# Patient Record
Sex: MALE | Race: ASIAN | NOT HISPANIC OR LATINO | Employment: FULL TIME | URBAN - METROPOLITAN AREA
[De-identification: names, ages, dates, MRNs, and addresses within clinical notes are randomized per-mention and may not be internally consistent; named-entity substitution may affect disease eponyms.]

---

## 2017-05-12 ENCOUNTER — LAB VISIT (OUTPATIENT)
Dept: LAB | Facility: HOSPITAL | Age: 32
End: 2017-05-12
Attending: FAMILY MEDICINE
Payer: COMMERCIAL

## 2017-05-12 ENCOUNTER — OFFICE VISIT (OUTPATIENT)
Dept: INTERNAL MEDICINE | Facility: CLINIC | Age: 32
End: 2017-05-12
Payer: COMMERCIAL

## 2017-05-12 VITALS
OXYGEN SATURATION: 99 % | SYSTOLIC BLOOD PRESSURE: 122 MMHG | DIASTOLIC BLOOD PRESSURE: 84 MMHG | HEIGHT: 65 IN | TEMPERATURE: 96 F | BODY MASS INDEX: 28.25 KG/M2 | WEIGHT: 169.56 LBS | HEART RATE: 73 BPM

## 2017-05-12 DIAGNOSIS — Z00.00 ANNUAL PHYSICAL EXAM: Primary | ICD-10-CM

## 2017-05-12 DIAGNOSIS — Z00.00 ANNUAL PHYSICAL EXAM: ICD-10-CM

## 2017-05-12 LAB
ALBUMIN SERPL BCP-MCNC: 4.2 G/DL
ALP SERPL-CCNC: 78 U/L
ALT SERPL W/O P-5'-P-CCNC: 38 U/L
ANION GAP SERPL CALC-SCNC: 9 MMOL/L
AST SERPL-CCNC: 19 U/L
BASOPHILS # BLD AUTO: 0.03 K/UL
BASOPHILS NFR BLD: 0.4 %
BILIRUB SERPL-MCNC: 0.6 MG/DL
BUN SERPL-MCNC: 10 MG/DL
CALCIUM SERPL-MCNC: 9.9 MG/DL
CHLORIDE SERPL-SCNC: 104 MMOL/L
CHOLEST/HDLC SERPL: 3.2 {RATIO}
CO2 SERPL-SCNC: 25 MMOL/L
CREAT SERPL-MCNC: 0.9 MG/DL
DIFFERENTIAL METHOD: ABNORMAL
EOSINOPHIL # BLD AUTO: 0.2 K/UL
EOSINOPHIL NFR BLD: 2.2 %
ERYTHROCYTE [DISTWIDTH] IN BLOOD BY AUTOMATED COUNT: 15.6 %
EST. GFR  (AFRICAN AMERICAN): >60 ML/MIN/1.73 M^2
EST. GFR  (NON AFRICAN AMERICAN): >60 ML/MIN/1.73 M^2
FERRITIN SERPL-MCNC: 41 NG/ML
GLUCOSE SERPL-MCNC: 91 MG/DL
HCT VFR BLD AUTO: 45.9 %
HDL/CHOLESTEROL RATIO: 31.3 %
HDLC SERPL-MCNC: 115 MG/DL
HDLC SERPL-MCNC: 36 MG/DL
HGB BLD-MCNC: 14.6 G/DL
IRON SERPL-MCNC: 78 UG/DL
LDLC SERPL CALC-MCNC: 58.6 MG/DL
LYMPHOCYTES # BLD AUTO: 2.8 K/UL
LYMPHOCYTES NFR BLD: 38.8 %
MCH RBC QN AUTO: 22.8 PG
MCHC RBC AUTO-ENTMCNC: 31.8 %
MCV RBC AUTO: 72 FL
MONOCYTES # BLD AUTO: 0.5 K/UL
MONOCYTES NFR BLD: 6.4 %
NEUTROPHILS # BLD AUTO: 3.7 K/UL
NEUTROPHILS NFR BLD: 52.2 %
NONHDLC SERPL-MCNC: 79 MG/DL
PLATELET # BLD AUTO: 206 K/UL
PMV BLD AUTO: ABNORMAL FL
POTASSIUM SERPL-SCNC: 4.1 MMOL/L
PROT SERPL-MCNC: 7.9 G/DL
RBC # BLD AUTO: 6.4 M/UL
SATURATED IRON: 19 %
SODIUM SERPL-SCNC: 138 MMOL/L
TOTAL IRON BINDING CAPACITY: 414 UG/DL
TRANSFERRIN SERPL-MCNC: 280 MG/DL
TRIGL SERPL-MCNC: 102 MG/DL
WBC # BLD AUTO: 7.16 K/UL

## 2017-05-12 PROCEDURE — 36415 COLL VENOUS BLD VENIPUNCTURE: CPT | Mod: PO

## 2017-05-12 PROCEDURE — 99395 PREV VISIT EST AGE 18-39: CPT | Mod: S$GLB,,, | Performed by: FAMILY MEDICINE

## 2017-05-12 PROCEDURE — 80053 COMPREHEN METABOLIC PANEL: CPT

## 2017-05-12 PROCEDURE — 99999 PR PBB SHADOW E&M-EST. PATIENT-LVL III: CPT | Mod: PBBFAC,,, | Performed by: FAMILY MEDICINE

## 2017-05-12 PROCEDURE — 85025 COMPLETE CBC W/AUTO DIFF WBC: CPT

## 2017-05-12 PROCEDURE — 82728 ASSAY OF FERRITIN: CPT

## 2017-05-12 PROCEDURE — 83540 ASSAY OF IRON: CPT

## 2017-05-12 PROCEDURE — 80061 LIPID PANEL: CPT

## 2017-05-12 NOTE — PROGRESS NOTES
Subjective:       Patient ID: Hira Riggs is a 31 y.o. male.    Chief Complaint: Annual Exam    HPI Comments: Annual Exam:       Pt has been having some ongoing HA off and on. Pt is otherwise generally good health. Pt reports the HA has gotten severe he takes Advil. Pt reports somewhat mix of severity of HA. Pt reports location of HA can be generalized. Unilateral but can be bilaterally. Pt reports no visual change but is sensitive to light and sound. Pt reports going to sleep it goes away. Has not woke him up at night.    Review of Systems   Constitutional: Negative.    Respiratory: Negative.    Cardiovascular: Negative.    Musculoskeletal: Negative.    Skin: Negative.    Neurological: Positive for headaches.   Psychiatric/Behavioral: Negative.        Objective:      Physical Exam   Constitutional: He is oriented to person, place, and time. He appears well-developed and well-nourished.   Cardiovascular: Normal rate and regular rhythm.    Pulmonary/Chest: Effort normal and breath sounds normal.   Abdominal: Soft. Bowel sounds are normal.   Neurological: He is alert and oriented to person, place, and time.   Psychiatric: He has a normal mood and affect. His behavior is normal.       Assessment:       1. Annual physical exam        Plan:       Annual physical exam  Comments:  Will do CBC, CMP and Lipid with iron studies  Orders:  -     Comprehensive metabolic panel; Future; Expected date: 5/12/17  -     CBC auto differential; Future; Expected date: 5/12/17  -     Lipid panel; Future; Expected date: 5/12/17  -     Iron and TIBC; Future; Expected date: 5/12/17  -     Ferritin; Future; Expected date: 5/12/17

## 2017-05-12 NOTE — MR AVS SNAPSHOT
[facility] Internal Medicine  [address] [place] [address]  Phone: [phone]  Fax: [phone]                  Hira Riggs   2017 7:40 AM   Office Visit    Description:  Male : 1985   Provider:  Dario Borges MD   Department:  [facility] Internal Medicine           Reason for Visit     Annual Exam           Diagnoses this Visit        Comments    Annual physical exam    -  Primary Will do CBC, CMP and Lipid with iron studies           To Do List           Future Appointments        Provider Department Dept Phone    2017 9:10 AM LABORATORY, [facility] [phone]      Goals (5 Years of Data)              Today    14    Blood Pressure < 140/90   122/84    122/84    LDL CHOLESTEROL < 130     62.8      Weight < 200 lb (90.719 kg)   76.9 kg (169 lb 8.5 oz)    82.7 kg (182 lb 5 oz)      Follow-Up and Disposition     Return in about 1 year (around 2018).      [facility]     [facility] Nurse Care Line -  Assistance  Unless otherwise directed by your provider, please contact [facility], our nurse care line that is available for  assistance.     Registered nurses in the [facility] provide: appointment scheduling, clinical advisement, health education, and other advisory services.  Call: [phone] (toll free)               Medications           Message regarding Medications     Verify the changes and/or additions to your medication regime listed below are the same as discussed with your clinician today.  If any of these changes or additions are incorrect, please notify your healthcare provider.             Verify that the below list of medications is an accurate representation of the medications you are currently taking.  If none reported, the list may be blank. If incorrect, please contact your healthcare provider. Carry this list with you in case of emergency.                Clinical  "Reference Information           Your Vitals Were     BP Pulse Temp Height Weight SpO2    122/84 (BP Location: Right arm, Patient Position: Sitting) 73 96.1 °F (35.6 °C) (Tympanic) 5' 5" (1.651 m) 76.9 kg (169 lb 8.5 oz) 99%    BMI                28.21 kg/m2          Blood Pressure          Most Recent Value    BP  122/84      Allergies as of 5/12/2017     No Known Allergies      Immunizations Administered on Date of Encounter - 5/12/2017     None      Orders Placed During Today's Visit     Future Labs/Procedures Expected by Expires    CBC auto differential  5/12/2017 7/11/2018    Comprehensive metabolic panel  5/12/2017 7/11/2018    Ferritin  5/12/2017 7/11/2018    Iron and TIBC  5/12/2017 7/11/2018    Lipid panel  5/12/2017 5/12/2018      Language Assistance Services     ATTENTION: Language assistance services are available, free of charge. Please call [phone].      ATENCIÓN: Si habla español, tiene a corral disposición servicios gratuitos de asistencia lingüística. Llame al [phone].     CHÚ Ý: N?u b?n nói Ti?ng Vi?t, có các d?ch v? h? tr? ngôn ng? mi?n phí dành cho b?n. G?i s? [phone].         [facility] complies with applicable Federal civil rights laws and does not discriminate on the basis of race, color, national origin, age, disability, or sex.        "

## 2017-05-15 RX ORDER — FERROUS SULFATE 325(65) MG
325 TABLET ORAL 2 TIMES DAILY
Qty: 60 TABLET | Refills: 8 | COMMUNITY
Start: 2017-05-15 | End: 2018-03-05 | Stop reason: ALTCHOICE

## 2017-08-14 PROBLEM — Z00.00 ANNUAL PHYSICAL EXAM: Status: RESOLVED | Noted: 2017-05-12 | Resolved: 2017-08-14

## 2018-03-05 ENCOUNTER — OFFICE VISIT (OUTPATIENT)
Dept: INTERNAL MEDICINE | Facility: CLINIC | Age: 33
End: 2018-03-05
Payer: COMMERCIAL

## 2018-03-05 VITALS
WEIGHT: 163.56 LBS | SYSTOLIC BLOOD PRESSURE: 128 MMHG | HEART RATE: 98 BPM | HEIGHT: 65 IN | DIASTOLIC BLOOD PRESSURE: 82 MMHG | TEMPERATURE: 96 F | BODY MASS INDEX: 27.25 KG/M2

## 2018-03-05 DIAGNOSIS — J32.1 FRONTAL SINUSITIS, UNSPECIFIED CHRONICITY: Primary | ICD-10-CM

## 2018-03-05 PROCEDURE — 99999 PR PBB SHADOW E&M-EST. PATIENT-LVL III: CPT | Mod: PBBFAC,,, | Performed by: FAMILY MEDICINE

## 2018-03-05 PROCEDURE — 99214 OFFICE O/P EST MOD 30 MIN: CPT | Mod: S$GLB,,, | Performed by: FAMILY MEDICINE

## 2018-03-05 RX ORDER — AMOXICILLIN AND CLAVULANATE POTASSIUM 500; 125 MG/1; MG/1
1 TABLET, FILM COATED ORAL 2 TIMES DAILY
Qty: 20 TABLET | Refills: 0 | Status: SHIPPED | OUTPATIENT
Start: 2018-03-05 | End: 2018-03-05 | Stop reason: SDUPTHER

## 2018-03-05 RX ORDER — METHYLPREDNISOLONE 4 MG/1
TABLET ORAL
Qty: 1 PACKAGE | Refills: 0 | Status: SHIPPED | OUTPATIENT
Start: 2018-03-05 | End: 2018-03-26

## 2018-03-05 RX ORDER — AMOXICILLIN AND CLAVULANATE POTASSIUM 500; 125 MG/1; MG/1
1 TABLET, FILM COATED ORAL 2 TIMES DAILY
Qty: 20 TABLET | Refills: 0 | Status: SHIPPED | OUTPATIENT
Start: 2018-03-05 | End: 2018-05-14 | Stop reason: ALTCHOICE

## 2018-03-05 RX ORDER — METHYLPREDNISOLONE 4 MG/1
TABLET ORAL
Qty: 1 PACKAGE | Refills: 0 | Status: SHIPPED | OUTPATIENT
Start: 2018-03-05 | End: 2018-03-05 | Stop reason: SDUPTHER

## 2018-03-05 NOTE — PROGRESS NOTES
Subjective:       Patient ID: Hira Riggs is a 32 y.o. male.    Chief Complaint: Nasal Congestion and Insomnia    Sinus Congestion:       Pt is having some sinus congestion. Pt is having some increase sinus drainage. Pt is not coughing. Sinus pressure.       Review of Systems   HENT: Positive for postnasal drip, sinus pain and sinus pressure. Negative for sore throat.    Respiratory: Negative.    Cardiovascular: Negative.    Genitourinary: Negative.    Neurological: Negative.    Hematological: Negative.        Objective:      Physical Exam   Constitutional: He appears well-developed and well-nourished.   HENT:   Right Ear: Tympanic membrane is erythematous. A middle ear effusion is present.   Left Ear: Tympanic membrane is erythematous. A middle ear effusion is present.   Nose: Mucosal edema and rhinorrhea present. Right sinus exhibits frontal sinus tenderness. Right sinus exhibits no maxillary sinus tenderness. Left sinus exhibits frontal sinus tenderness. Left sinus exhibits no maxillary sinus tenderness.   Cardiovascular: Normal rate and regular rhythm.    Pulmonary/Chest: Effort normal and breath sounds normal. He has no wheezes. He has no rales.   Abdominal: Soft. Bowel sounds are normal.   Skin: Skin is warm and dry.       Assessment:       1. Frontal sinusitis, unspecified chronicity        Plan:       Frontal sinusitis, unspecified chronicity  Comments:  Will start pt on augmentin 500 mg and medrol dose pack.    Other orders  -     Discontinue: amoxicillin-clavulanate 500-125mg (AUGMENTIN) 500-125 mg Tab; Take 1 tablet (500 mg total) by mouth 2 (two) times daily.  Dispense: 20 tablet; Refill: 0  -     Discontinue: methylPREDNISolone (MEDROL DOSEPACK) 4 mg tablet; use as directed  Dispense: 1 Package; Refill: 0  -     Discontinue: methylPREDNISolone (MEDROL DOSEPACK) 4 mg tablet; use as directed  Dispense: 1 Package; Refill: 0  -     Discontinue: amoxicillin-clavulanate 500-125mg (AUGMENTIN) 500-125  mg Tab; Take 1 tablet (500 mg total) by mouth 2 (two) times daily.  Dispense: 20 tablet; Refill: 0  -     methylPREDNISolone (MEDROL DOSEPACK) 4 mg tablet; use as directed  Dispense: 1 Package; Refill: 0  -     amoxicillin-clavulanate 500-125mg (AUGMENTIN) 500-125 mg Tab; Take 1 tablet (500 mg total) by mouth 2 (two) times daily.  Dispense: 20 tablet; Refill: 0

## 2018-05-10 ENCOUNTER — TELEPHONE (OUTPATIENT)
Dept: INTERNAL MEDICINE | Facility: CLINIC | Age: 33
End: 2018-05-10

## 2018-05-10 DIAGNOSIS — Z00.00 ANNUAL PHYSICAL EXAM: Primary | ICD-10-CM

## 2018-05-10 NOTE — TELEPHONE ENCOUNTER
Patient has an appointment scheduled 5/14/2018 and would like lab orders done prior. Please place orders

## 2018-05-10 NOTE — TELEPHONE ENCOUNTER
----- Message from Amie Jha sent at 5/9/2018  6:31 PM CDT -----  Contact: Pt  Pt is calling to have lab orders placed in system and can be reached at [phone].    Thank you

## 2018-05-11 ENCOUNTER — LAB VISIT (OUTPATIENT)
Dept: LAB | Facility: HOSPITAL | Age: 33
End: 2018-05-11
Attending: FAMILY MEDICINE
Payer: COMMERCIAL

## 2018-05-11 DIAGNOSIS — Z00.00 ANNUAL PHYSICAL EXAM: ICD-10-CM

## 2018-05-11 LAB
ALBUMIN SERPL BCP-MCNC: 4.2 G/DL
ALP SERPL-CCNC: 79 U/L
ALT SERPL W/O P-5'-P-CCNC: 44 U/L
ANION GAP SERPL CALC-SCNC: 7 MMOL/L
AST SERPL-CCNC: 23 U/L
BASOPHILS # BLD AUTO: 0.03 K/UL
BASOPHILS NFR BLD: 0.5 %
BILIRUB SERPL-MCNC: 0.7 MG/DL
BUN SERPL-MCNC: 8 MG/DL
CALCIUM SERPL-MCNC: 9.7 MG/DL
CHLORIDE SERPL-SCNC: 105 MMOL/L
CHOLEST SERPL-MCNC: 109 MG/DL
CHOLEST/HDLC SERPL: 3.1 {RATIO}
CO2 SERPL-SCNC: 27 MMOL/L
CREAT SERPL-MCNC: 0.9 MG/DL
DIFFERENTIAL METHOD: ABNORMAL
EOSINOPHIL # BLD AUTO: 0.1 K/UL
EOSINOPHIL NFR BLD: 2 %
ERYTHROCYTE [DISTWIDTH] IN BLOOD BY AUTOMATED COUNT: 23.1 %
EST. GFR  (AFRICAN AMERICAN): >60 ML/MIN/1.73 M^2
EST. GFR  (NON AFRICAN AMERICAN): >60 ML/MIN/1.73 M^2
GLUCOSE SERPL-MCNC: 87 MG/DL
HCT VFR BLD AUTO: 41.3 %
HDLC SERPL-MCNC: 35 MG/DL
HDLC SERPL: 32.1 %
HGB BLD-MCNC: 12.2 G/DL
IMM GRANULOCYTES # BLD AUTO: 0.01 K/UL
IMM GRANULOCYTES NFR BLD AUTO: 0.2 %
LDLC SERPL CALC-MCNC: 59.2 MG/DL
LYMPHOCYTES # BLD AUTO: 2.5 K/UL
LYMPHOCYTES NFR BLD: 38.5 %
MCH RBC QN AUTO: 19.4 PG
MCHC RBC AUTO-ENTMCNC: 29.5 G/DL
MCV RBC AUTO: 66 FL
MONOCYTES # BLD AUTO: 0.5 K/UL
MONOCYTES NFR BLD: 7.9 %
NEUTROPHILS # BLD AUTO: 3.4 K/UL
NEUTROPHILS NFR BLD: 50.9 %
NONHDLC SERPL-MCNC: 74 MG/DL
NRBC BLD-RTO: 0 /100 WBC
PLATELET # BLD AUTO: 193 K/UL
PMV BLD AUTO: ABNORMAL FL
POTASSIUM SERPL-SCNC: 3.9 MMOL/L
PROT SERPL-MCNC: 7.7 G/DL
RBC # BLD AUTO: 6.28 M/UL
SODIUM SERPL-SCNC: 139 MMOL/L
TRIGL SERPL-MCNC: 74 MG/DL
WBC # BLD AUTO: 6.59 K/UL

## 2018-05-11 PROCEDURE — 36415 COLL VENOUS BLD VENIPUNCTURE: CPT | Mod: PO

## 2018-05-11 PROCEDURE — 80061 LIPID PANEL: CPT

## 2018-05-11 PROCEDURE — 85025 COMPLETE CBC W/AUTO DIFF WBC: CPT

## 2018-05-11 PROCEDURE — 80053 COMPREHEN METABOLIC PANEL: CPT

## 2018-05-14 ENCOUNTER — OFFICE VISIT (OUTPATIENT)
Dept: INTERNAL MEDICINE | Facility: CLINIC | Age: 33
End: 2018-05-14
Payer: COMMERCIAL

## 2018-05-14 VITALS
DIASTOLIC BLOOD PRESSURE: 68 MMHG | SYSTOLIC BLOOD PRESSURE: 112 MMHG | BODY MASS INDEX: 26.93 KG/M2 | WEIGHT: 161.63 LBS | HEART RATE: 98 BPM | TEMPERATURE: 97 F | HEIGHT: 65 IN

## 2018-05-14 DIAGNOSIS — E55.9 VITAMIN D DEFICIENCY: ICD-10-CM

## 2018-05-14 DIAGNOSIS — D50.9 MICROCYTIC ANEMIA: ICD-10-CM

## 2018-05-14 DIAGNOSIS — Z00.00 ANNUAL PHYSICAL EXAM: Primary | ICD-10-CM

## 2018-05-14 PROCEDURE — 99395 PREV VISIT EST AGE 18-39: CPT | Mod: S$GLB,,, | Performed by: FAMILY MEDICINE

## 2018-05-14 PROCEDURE — 99999 PR PBB SHADOW E&M-EST. PATIENT-LVL III: CPT | Mod: PBBFAC,,, | Performed by: FAMILY MEDICINE

## 2018-05-14 NOTE — PROGRESS NOTES
Subjective:       Patient ID: Hira Riggs is a 32 y.o. male.    Chief Complaint: Annual Exam and Foot Pain    Annual Exam:      Pt is a 32 year old who is generally good health.       Foot Pain   This is a new problem. The current episode started today. The problem occurs constantly. The problem has been unchanged. Pertinent negatives include no abdominal pain, joint swelling, myalgias or nausea. Nothing aggravates the symptoms. He has tried nothing for the symptoms. The treatment provided mild relief.     Review of Systems   Constitutional: Negative.    HENT: Negative.    Respiratory: Negative.    Gastrointestinal: Negative.  Negative for abdominal pain and nausea.   Genitourinary: Negative.  Negative for frequency and genital sores.   Musculoskeletal: Negative.  Negative for joint swelling and myalgias.   Neurological: Negative.    Hematological: Negative.        Objective:      Physical Exam   Constitutional: He is oriented to person, place, and time. He appears well-developed and well-nourished.   HENT:   Head: Normocephalic.   Eyes: EOM are normal. Pupils are equal, round, and reactive to light.   Neck: Normal range of motion. Neck supple. No JVD present. No thyromegaly present.   Cardiovascular: Normal rate and regular rhythm.    Pulmonary/Chest: Effort normal and breath sounds normal.   Abdominal: Soft. Bowel sounds are normal.   Musculoskeletal: Normal range of motion.   Lymphadenopathy:     He has no cervical adenopathy.   Neurological: He is alert and oriented to person, place, and time. He has normal reflexes.   Skin: Skin is warm and dry.   Psychiatric: He has a normal mood and affect. His behavior is normal.       Assessment:       1. Annual physical exam    2. Microcytic anemia    3. Vitamin D deficiency        Plan:       Annual physical exam    Microcytic anemia  -     CBC auto differential; Future; Expected date: 09/14/2018  -     Ferritin; Future; Expected date: 09/14/2018  -     Iron and  TIBC; Future; Expected date: 09/14/2018    Vitamin D deficiency  -     Vitamin D; Future; Expected date: 09/14/2018

## 2018-08-13 PROBLEM — Z00.00 ANNUAL PHYSICAL EXAM: Status: RESOLVED | Noted: 2017-05-12 | Resolved: 2018-08-13

## 2018-09-14 ENCOUNTER — LAB VISIT (OUTPATIENT)
Dept: LAB | Facility: HOSPITAL | Age: 33
End: 2018-09-14
Attending: FAMILY MEDICINE
Payer: COMMERCIAL

## 2018-09-14 DIAGNOSIS — D50.9 MICROCYTIC ANEMIA: ICD-10-CM

## 2018-09-14 DIAGNOSIS — E55.9 VITAMIN D DEFICIENCY: ICD-10-CM

## 2018-09-14 LAB
25(OH)D3+25(OH)D2 SERPL-MCNC: 18 NG/ML
BASOPHILS # BLD AUTO: 0.04 K/UL
BASOPHILS NFR BLD: 0.6 %
DIFFERENTIAL METHOD: ABNORMAL
EOSINOPHIL # BLD AUTO: 0.1 K/UL
EOSINOPHIL NFR BLD: 1.8 %
ERYTHROCYTE [DISTWIDTH] IN BLOOD BY AUTOMATED COUNT: 20.2 %
FERRITIN SERPL-MCNC: 11 NG/ML
HCT VFR BLD AUTO: 47.4 %
HGB BLD-MCNC: 14.5 G/DL
IMM GRANULOCYTES # BLD AUTO: 0.03 K/UL
IMM GRANULOCYTES NFR BLD AUTO: 0.4 %
IRON SERPL-MCNC: 111 UG/DL
LYMPHOCYTES # BLD AUTO: 2.4 K/UL
LYMPHOCYTES NFR BLD: 34.4 %
MCH RBC QN AUTO: 21.6 PG
MCHC RBC AUTO-ENTMCNC: 30.6 G/DL
MCV RBC AUTO: 71 FL
MONOCYTES # BLD AUTO: 0.5 K/UL
MONOCYTES NFR BLD: 7.5 %
NEUTROPHILS # BLD AUTO: 3.9 K/UL
NEUTROPHILS NFR BLD: 55.3 %
NRBC BLD-RTO: 0 /100 WBC
PLATELET # BLD AUTO: 172 K/UL
PMV BLD AUTO: ABNORMAL FL
RBC # BLD AUTO: 6.71 M/UL
SATURATED IRON: 22 %
TOTAL IRON BINDING CAPACITY: 502 UG/DL
TRANSFERRIN SERPL-MCNC: 339 MG/DL
WBC # BLD AUTO: 7.04 K/UL

## 2018-09-14 PROCEDURE — 82728 ASSAY OF FERRITIN: CPT

## 2018-09-14 PROCEDURE — 36415 COLL VENOUS BLD VENIPUNCTURE: CPT | Mod: PO

## 2018-09-14 PROCEDURE — 83540 ASSAY OF IRON: CPT

## 2018-09-14 PROCEDURE — 82306 VITAMIN D 25 HYDROXY: CPT

## 2018-09-14 PROCEDURE — 85025 COMPLETE CBC W/AUTO DIFF WBC: CPT

## 2018-09-19 ENCOUNTER — OFFICE VISIT (OUTPATIENT)
Dept: INTERNAL MEDICINE | Facility: CLINIC | Age: 33
End: 2018-09-19
Payer: COMMERCIAL

## 2018-09-19 VITALS
SYSTOLIC BLOOD PRESSURE: 102 MMHG | BODY MASS INDEX: 27.51 KG/M2 | HEIGHT: 65 IN | DIASTOLIC BLOOD PRESSURE: 80 MMHG | WEIGHT: 165.13 LBS | HEART RATE: 85 BPM | TEMPERATURE: 98 F

## 2018-09-19 DIAGNOSIS — E55.9 VITAMIN D DEFICIENCY: ICD-10-CM

## 2018-09-19 DIAGNOSIS — D50.9 MICROCYTIC ANEMIA: Primary | ICD-10-CM

## 2018-09-19 PROCEDURE — 99999 PR PBB SHADOW E&M-EST. PATIENT-LVL III: CPT | Mod: PBBFAC,,, | Performed by: FAMILY MEDICINE

## 2018-09-19 PROCEDURE — 99213 OFFICE O/P EST LOW 20 MIN: CPT | Mod: S$GLB,,, | Performed by: FAMILY MEDICINE

## 2018-09-19 NOTE — PROGRESS NOTES
Subjective:       Patient ID: Hira Riggs is a 32 y.o. male.    Chief Complaint: Follow-up labs    F/U:      Pt a 32 year old who discussed his microcytic anemia of which now looks like iron is normal but still with low MCV suggesting possible thalassemia.       Review of Systems   Constitutional: Negative.    HENT: Negative.    Respiratory: Negative.    Cardiovascular: Negative.    Gastrointestinal: Negative.    Skin: Negative.    Neurological: Negative.    Psychiatric/Behavioral: Negative.        Objective:      Physical Exam   Constitutional: He is oriented to person, place, and time. He appears well-developed and well-nourished.   Cardiovascular: Normal rate and regular rhythm.   Pulmonary/Chest: Effort normal and breath sounds normal. He has no wheezes.   Abdominal: Soft. Bowel sounds are normal.   Neurological: He is alert and oriented to person, place, and time.   Skin: Skin is warm and dry.       Assessment:       1. Microcytic anemia    2. Vitamin D deficiency        Plan:       Microcytic anemia  Comments:  iron corrected low MCV may be thalassemia.   Orders:  -     Ambulatory consult to Hematology    Vitamin D deficiency  Comments:  Over the counter sup with Vit D

## 2018-09-25 ENCOUNTER — INITIAL CONSULT (OUTPATIENT)
Dept: HEMATOLOGY/ONCOLOGY | Facility: CLINIC | Age: 33
End: 2018-09-25
Payer: COMMERCIAL

## 2018-09-25 ENCOUNTER — TELEPHONE (OUTPATIENT)
Dept: HEMATOLOGY/ONCOLOGY | Facility: CLINIC | Age: 33
End: 2018-09-25

## 2018-09-25 VITALS
SYSTOLIC BLOOD PRESSURE: 150 MMHG | TEMPERATURE: 99 F | OXYGEN SATURATION: 99 % | HEIGHT: 65 IN | HEART RATE: 66 BPM | DIASTOLIC BLOOD PRESSURE: 80 MMHG | WEIGHT: 164 LBS | BODY MASS INDEX: 27.32 KG/M2 | RESPIRATION RATE: 18 BRPM

## 2018-09-25 DIAGNOSIS — D50.0 IRON DEFICIENCY ANEMIA DUE TO CHRONIC BLOOD LOSS: ICD-10-CM

## 2018-09-25 DIAGNOSIS — I35.1 AORTIC EJECTION MURMUR: ICD-10-CM

## 2018-09-25 DIAGNOSIS — D50.9 MICROCYTIC ANEMIA: Primary | ICD-10-CM

## 2018-09-25 PROCEDURE — 99999 PR PBB SHADOW E&M-EST. PATIENT-LVL III: CPT | Mod: PBBFAC,,, | Performed by: INTERNAL MEDICINE

## 2018-09-25 PROCEDURE — 99245 OFF/OP CONSLTJ NEW/EST HI 55: CPT | Mod: S$GLB,,, | Performed by: INTERNAL MEDICINE

## 2018-09-25 NOTE — LETTER
September 25, 2018      Dario Borges MD  [address] [place] [address]           [facility] - H[facility]ology Oncology  [address] [place] [address]  Phone: [phone]  Fax: [phone]          Patient: Hira Riggs   MR Number: [id number]   YOB: 1985   Date of Visit: 9/25/2018       Dear Dr. Dario Borges:    Thank you for referring Hira Riggs to me for evaluation. Attached you will find relevant portions of my assessment and plan of care.    If you have questions, please do not hesitate to call me. I look forward to following Hira Riggs along with you.    Sincerely,    Pilo March MD    Enclosure  CC:  No Recipients    If you would like to receive this communication electronically, please contact [email or web address] or [phone] to request more information on [employer] Link access.    For providers and/or their staff who would like to refer a patient to [facility], please contact us through our one-stop-shop provider referral line, [facility], at [phone].    If you feel you have received this communication in error or would no longer like to receive these types of communications, please e-mail [email or web address]

## 2018-09-25 NOTE — PROGRESS NOTES
Subjective:       Patient ID: Hira Riggs is a 32 y.o. male.    Chief Complaint: Consult; Anemia; and Results    HPI 32-year-old male referred to me for diagnosis of microcytosis with elevated RBC count in iron deficiency    History reviewed. No pertinent past medical history.  Family History   Problem Relation Age of Onset    Other Other         none.     Social History     Socioeconomic History    Marital status:      Spouse name: Not on file    Number of children: Not on file    Years of education: Not on file    Highest education level: Not on file   Social Needs    Financial resource strain: Not on file    Food insecurity - worry: Not on file    Food insecurity - inability: Not on file    Transportation needs - medical: Not on file    Transportation needs - non-medical: Not on file   Occupational History    Not on file   Tobacco Use    Smoking status: Never Smoker    Smokeless tobacco: Never Used   Substance and Sexual Activity    Alcohol use: No    Drug use: No    Sexual activity: Not on file   Other Topics Concern    Not on file   Social History Narrative    Not on file     History reviewed. No pertinent surgical history.    Labs:  Lab Results   Component Value Date    WBC 7.04 09/14/2018    HGB 14.5 09/14/2018    HCT 47.4 09/14/2018    MCV 71 (L) 09/14/2018     09/14/2018     BMP  Lab Results   Component Value Date     05/11/2018    K 3.9 05/11/2018     05/11/2018    CO2 27 05/11/2018    BUN 8 05/11/2018    CREATININE 0.9 05/11/2018    CALCIUM 9.7 05/11/2018    ANIONGAP 7 (L) 05/11/2018    ESTGFRAFRICA >60.0 05/11/2018    EGFRNONAA >60.0 05/11/2018     Lab Results   Component Value Date    ALT 44 05/11/2018    AST 23 05/11/2018    ALKPHOS 79 05/11/2018    BILITOT 0.7 05/11/2018       Lab Results   Component Value Date    IRON 111 09/14/2018    TIBC 502 (H) 09/14/2018    FERRITIN 11 (L) 09/14/2018     No results found for: NQOMIXVK35  No results found for:  FOLATE  No results found for: TSH      Review of Systems   Constitutional: Negative for activity change, appetite change, chills, diaphoresis, fatigue, fever and unexpected weight change.   HENT: Negative for congestion, dental problem, drooling, ear discharge, ear pain, facial swelling, hearing loss, mouth sores, nosebleeds, postnasal drip, rhinorrhea, sinus pressure, sneezing, sore throat, tinnitus, trouble swallowing and voice change.    Eyes: Negative for photophobia, pain, discharge, redness, itching and visual disturbance.   Respiratory: Negative for apnea, cough, choking, chest tightness, shortness of breath, wheezing and stridor.    Cardiovascular: Negative for chest pain, palpitations and leg swelling.   Gastrointestinal: Negative for abdominal distention, abdominal pain, anal bleeding, blood in stool, constipation, diarrhea, nausea, rectal pain and vomiting.   Endocrine: Negative for cold intolerance, heat intolerance, polydipsia, polyphagia and polyuria.   Genitourinary: Negative for decreased urine volume, difficulty urinating, discharge, dysuria, enuresis, flank pain, frequency, genital sores, hematuria, penile pain, penile swelling, scrotal swelling, testicular pain and urgency.   Musculoskeletal: Negative for arthralgias, back pain, gait problem, joint swelling, myalgias, neck pain and neck stiffness.   Skin: Negative for color change, pallor, rash and wound.   Allergic/Immunologic: Negative for environmental allergies, food allergies and immunocompromised state.   Neurological: Negative for dizziness, tremors, seizures, syncope, facial asymmetry, speech difficulty, weakness, light-headedness, numbness and headaches.   Hematological: Negative for adenopathy. Does not bruise/bleed easily.   Psychiatric/Behavioral: Negative for agitation, behavioral problems, confusion, decreased concentration, dysphoric mood, hallucinations, self-injury, sleep disturbance and suicidal ideas. The patient is  nervous/anxious. The patient is not hyperactive.        Objective:      Physical Exam   Constitutional: He is oriented to person, place, and time. He appears well-developed and well-nourished. No distress.   HENT:   Head: Normocephalic.   Right Ear: Tympanic membrane and external ear normal.   Left Ear: Tympanic membrane and external ear normal.   Nose: Nose normal. Right sinus exhibits no maxillary sinus tenderness and no frontal sinus tenderness. Left sinus exhibits no maxillary sinus tenderness and no frontal sinus tenderness.   Mouth/Throat: Oropharynx is clear and moist. No oropharyngeal exudate.   Eyes: EOM and lids are normal. Pupils are equal, round, and reactive to light. Right eye exhibits no discharge. Left eye exhibits no discharge. Right conjunctiva is not injected. Right conjunctiva has no hemorrhage. Left conjunctiva is not injected. Left conjunctiva has no hemorrhage. No scleral icterus. Right eye exhibits normal extraocular motion. Left eye exhibits normal extraocular motion.   Neck: Normal range of motion. Neck supple. No JVD present. No tracheal deviation present. No thyromegaly present.   Cardiovascular: Normal rate and regular rhythm.   Murmur heard.  2/6 systolic ejection murmur aortic region   Pulmonary/Chest: Effort normal and breath sounds normal. No stridor. No respiratory distress.   Abdominal: Soft. Bowel sounds are normal. He exhibits no mass. There is no hepatosplenomegaly, splenomegaly or hepatomegaly. There is no tenderness.   Musculoskeletal: Normal range of motion. He exhibits no edema or tenderness.   Lymphadenopathy:        Head (right side): No posterior auricular and no occipital adenopathy present.        Head (left side): No posterior auricular and no occipital adenopathy present.     He has no cervical adenopathy.        Right cervical: No superficial cervical, no deep cervical and no posterior cervical adenopathy present.       Left cervical: No superficial cervical, no deep  cervical and no posterior cervical adenopathy present.     He has no axillary adenopathy.        Right: No supraclavicular adenopathy present.        Left: No supraclavicular adenopathy present.   Neurological: He is alert and oriented to person, place, and time. He has normal strength. No cranial nerve deficit. Coordination normal.   Skin: Skin is dry. No rash noted. He is not diaphoretic. No cyanosis or erythema. Nails show no clubbing.   Psychiatric: He has a normal mood and affect. His behavior is normal. Judgment and thought content normal. Cognition and memory are normal.   Vitals reviewed.          Assessment:      1. Microcytic anemia    2. Iron deficiency anemia due to chronic blood loss    3. Aortic ejection murmur           Plan:     Documented iron deficiency in a male.  At this point elevated RBC high likely he has concurrent iron deficiency as well as thalassemia start on iron rich foods recheck in 2-3 months with CBC iron status and hemoglobin electrophoresis prior.  At this point EGD and colon ordered explain rationale to him.  In addition with aortic flow murmur recommend 2D echocardiogram communicate results through patient portal        Pilo March Jr, MD FACP

## 2018-09-27 ENCOUNTER — DOCUMENTATION ONLY (OUTPATIENT)
Dept: ENDOSCOPY | Facility: HOSPITAL | Age: 33
End: 2018-09-27

## 2018-09-27 RX ORDER — SODIUM, POTASSIUM,MAG SULFATES 17.5-3.13G
SOLUTION, RECONSTITUTED, ORAL ORAL
Qty: 354 ML | Refills: 0 | Status: ON HOLD | OUTPATIENT
Start: 2018-09-27 | End: 2018-11-20 | Stop reason: HOSPADM

## 2018-09-27 NOTE — PROGRESS NOTES
09/27/18 Per [employer], Person pressed touch tone key to speak with an endoscopy .  Colonoscopy/EGd scheduled for 11/29/18. Instructions given mailed and sent via my chart. Suprep ordered.

## 2018-10-06 ENCOUNTER — CLINICAL SUPPORT (OUTPATIENT)
Dept: CARDIOLOGY | Facility: CLINIC | Age: 33
End: 2018-10-06
Attending: INTERNAL MEDICINE
Payer: COMMERCIAL

## 2018-10-06 DIAGNOSIS — I35.1 AORTIC EJECTION MURMUR: ICD-10-CM

## 2018-10-06 LAB
DIASTOLIC DYSFUNCTION: NO
ESTIMATED PA SYSTOLIC PRESSURE: 21.49
MITRAL VALVE MOBILITY: NORMAL
RETIRED EF AND QEF - SEE NOTES: 60 (ref 55–65)
TRICUSPID VALVE REGURGITATION: NORMAL

## 2018-10-06 PROCEDURE — 93307 TTE W/O DOPPLER COMPLETE: CPT | Mod: S$GLB,,, | Performed by: INTERNAL MEDICINE

## 2018-10-09 ENCOUNTER — DOCUMENTATION ONLY (OUTPATIENT)
Dept: ENDOSCOPY | Facility: HOSPITAL | Age: 33
End: 2018-10-09

## 2018-10-09 NOTE — PROGRESS NOTES
Due to endo schedule change pt rescheduled from 11/29 to 11/20/18.     New EGD/colonoscopy prep instructions sent via [employer]. Included were suprep instructions and Low fiber diet. Suprep already on hand.

## 2018-11-20 ENCOUNTER — HOSPITAL ENCOUNTER (OUTPATIENT)
Facility: HOSPITAL | Age: 33
Discharge: HOME | End: 2018-11-20
Attending: INTERNAL MEDICINE | Admitting: INTERNAL MEDICINE
Payer: COMMERCIAL

## 2018-11-20 ENCOUNTER — ANESTHESIA (OUTPATIENT)
Dept: ENDOSCOPY | Facility: HOSPITAL | Age: 33
End: 2018-11-20
Payer: COMMERCIAL

## 2018-11-20 ENCOUNTER — ANESTHESIA EVENT (OUTPATIENT)
Dept: ENDOSCOPY | Facility: HOSPITAL | Age: 33
End: 2018-11-20
Payer: COMMERCIAL

## 2018-11-20 VITALS
HEART RATE: 96 BPM | RESPIRATION RATE: 18 BRPM | TEMPERATURE: 98 F | BODY MASS INDEX: 26.82 KG/M2 | OXYGEN SATURATION: 97 % | WEIGHT: 161 LBS | SYSTOLIC BLOOD PRESSURE: 120 MMHG | DIASTOLIC BLOOD PRESSURE: 61 MMHG | HEIGHT: 65 IN

## 2018-11-20 DIAGNOSIS — K29.30 CHRONIC SUPERFICIAL GASTRITIS WITHOUT BLEEDING: ICD-10-CM

## 2018-11-20 DIAGNOSIS — D50.0 IRON DEFICIENCY ANEMIA DUE TO CHRONIC BLOOD LOSS: Primary | ICD-10-CM

## 2018-11-20 DIAGNOSIS — K26.9 DUODENAL ULCER DISEASE: ICD-10-CM

## 2018-11-20 PROCEDURE — 25000003 PHARM REV CODE 250: Performed by: INTERNAL MEDICINE

## 2018-11-20 PROCEDURE — 27201012 HC FORCEPS, HOT/COLD, DISP: Performed by: INTERNAL MEDICINE

## 2018-11-20 PROCEDURE — 37000009 HC ANESTHESIA EA ADD 15 MINS: Performed by: INTERNAL MEDICINE

## 2018-11-20 PROCEDURE — 88305 TISSUE EXAM BY PATHOLOGIST: CPT | Performed by: PATHOLOGY

## 2018-11-20 PROCEDURE — 88342 IMHCHEM/IMCYTCHM 1ST ANTB: CPT | Mod: 26,,, | Performed by: PATHOLOGY

## 2018-11-20 PROCEDURE — 45378 DIAGNOSTIC COLONOSCOPY: CPT | Performed by: INTERNAL MEDICINE

## 2018-11-20 PROCEDURE — 25000003 PHARM REV CODE 250: Performed by: NURSE ANESTHETIST, CERTIFIED REGISTERED

## 2018-11-20 PROCEDURE — 37000008 HC ANESTHESIA 1ST 15 MINUTES: Performed by: INTERNAL MEDICINE

## 2018-11-20 PROCEDURE — 45378 DIAGNOSTIC COLONOSCOPY: CPT | Mod: ,,, | Performed by: INTERNAL MEDICINE

## 2018-11-20 PROCEDURE — 43239 EGD BIOPSY SINGLE/MULTIPLE: CPT | Mod: 51,,, | Performed by: INTERNAL MEDICINE

## 2018-11-20 PROCEDURE — 43239 EGD BIOPSY SINGLE/MULTIPLE: CPT | Performed by: INTERNAL MEDICINE

## 2018-11-20 PROCEDURE — 88305 TISSUE EXAM BY PATHOLOGIST: CPT | Mod: 26,,, | Performed by: PATHOLOGY

## 2018-11-20 PROCEDURE — 63600175 PHARM REV CODE 636 W HCPCS: Performed by: NURSE ANESTHETIST, CERTIFIED REGISTERED

## 2018-11-20 RX ORDER — PROPOFOL 10 MG/ML
VIAL (ML) INTRAVENOUS
Status: DISCONTINUED | OUTPATIENT
Start: 2018-11-20 | End: 2018-11-20

## 2018-11-20 RX ORDER — LIDOCAINE HYDROCHLORIDE 10 MG/ML
INJECTION INFILTRATION; PERINEURAL
Status: DISCONTINUED | OUTPATIENT
Start: 2018-11-20 | End: 2018-11-20

## 2018-11-20 RX ORDER — SODIUM CHLORIDE 0.9 % (FLUSH) 0.9 %
3 SYRINGE (ML) INJECTION
Status: DISCONTINUED | OUTPATIENT
Start: 2018-11-20 | End: 2018-11-20 | Stop reason: HOSPADM

## 2018-11-20 RX ORDER — SODIUM CHLORIDE, SODIUM LACTATE, POTASSIUM CHLORIDE, CALCIUM CHLORIDE 600; 310; 30; 20 MG/100ML; MG/100ML; MG/100ML; MG/100ML
INJECTION, SOLUTION INTRAVENOUS CONTINUOUS
Status: DISCONTINUED | OUTPATIENT
Start: 2018-11-20 | End: 2018-11-20 | Stop reason: HOSPADM

## 2018-11-20 RX ORDER — PANTOPRAZOLE SODIUM 40 MG/1
40 TABLET, DELAYED RELEASE ORAL DAILY
Qty: 30 TABLET | Refills: 3 | Status: SHIPPED | OUTPATIENT
Start: 2018-11-20 | End: 2021-01-25 | Stop reason: ALTCHOICE

## 2018-11-20 RX ADMIN — PROPOFOL 50 MG: 10 INJECTION, EMULSION INTRAVENOUS at 02:11

## 2018-11-20 RX ADMIN — PROPOFOL 150 MG: 10 INJECTION, EMULSION INTRAVENOUS at 02:11

## 2018-11-20 RX ADMIN — PROPOFOL 100 MG: 10 INJECTION, EMULSION INTRAVENOUS at 02:11

## 2018-11-20 RX ADMIN — SODIUM CHLORIDE, SODIUM LACTATE, POTASSIUM CHLORIDE, AND CALCIUM CHLORIDE: 600; 310; 30; 20 INJECTION, SOLUTION INTRAVENOUS at 01:11

## 2018-11-20 RX ADMIN — SODIUM CHLORIDE, SODIUM LACTATE, POTASSIUM CHLORIDE, AND CALCIUM CHLORIDE: 600; 310; 30; 20 INJECTION, SOLUTION INTRAVENOUS at 12:11

## 2018-11-20 RX ADMIN — LIDOCAINE HYDROCHLORIDE 2 ML: 10 INJECTION, SOLUTION INFILTRATION; PERINEURAL at 02:11

## 2018-11-20 NOTE — [employer] PATIENT INSTRUCTIONS
Discharge Summary/Instructions after an Endoscopic Procedure  Patient Name: Hira Riggs  Patient MRN: [id number]  Patient YOB: 1985 Tuesday, November 20, 2018 Vinod Epperson III, MD  RESTRICTIONS:  During your procedure today, you received medications for sedation.  These   medications may affect your judgment, balance and coordination.  Therefore,   for 24 hours, you have the following restrictions:   - DO NOT drive a car, operate machinery, make legal/financial decisions,   sign important papers or drink alcohol.    ACTIVITY:  Today: no heavy lifting, straining or running due to procedural   sedation/anesthesia.  The following day: return to full activity including work.  DIET:  Eat and drink normally unless instructed otherwise.     TREATMENT FOR COMMON SIDE EFFECTS:  - Mild abdominal pain, nausea, belching, bloating or excessive gas:  rest,   eat lightly and use a heating pad.  - Sore Throat: treat with throat lozenges and/or gargle with warm salt   water.  - Because air was used during the procedure, expelling large amounts of air   from your rectum or belching is normal.  - If a bowel prep was taken, you may not have a bowel movement for 1-3 days.    This is normal.  SYMPTOMS TO WATCH FOR AND REPORT TO YOUR PHYSICIAN:  1. Abdominal pain or bloating, other than gas cramps.  2. Chest pain.  3. Back pain.  4. Signs of infection such as: chills or fever occurring within 24 hours   after the procedure.  5. Rectal bleeding, which would show as bright red, maroon, or black stools.   (A tablespoon of blood from the rectum is not serious, especially if   hemorrhoids are present.)  6. Vomiting.  7. Weakness or dizziness.  GO DIRECTLY TO THE NEAREST EMERGENCY ROOM IF YOU HAVE ANY OF THE FOLLOWING:      Difficulty breathing              Chills and/or fever over 101 F   Persistent vomiting and/or vomiting blood   Severe abdominal pain   Severe chest pain   Black, tarry stools   Bleeding- more than one  tablespoon   Any other symptom or condition that you feel may need urgent attention  Your doctor recommends these additional instructions:  If any biopsies were taken, your doctors clinic will contact you in 1 to 2   weeks with any results.  - Await pathology results.   - Discharge patient to home (via wheelchair).   - Resume previous diet.   - Continue present medications.   - Await pathology results.   - Return to GI clinic as previously scheduled.  For questions, problems or results please call your physician Vinod Epperson III, MD at Work:  [phone]  If you have any questions about the above instructions, call the GI   department at [phone] or call the endoscopy unit at [phone]   from 7am until 3 pm.  [facility] [place], EMERGENCY ROOM PHONE NUMBER:   [phone]  IF A COMPLICATION OR EMERGENCY SITUATION ARISES AND YOU ARE UNABLE TO REACH   YOUR PHYSICIAN - GO DIRECTLY TO THE EMERGENCY ROOM.  I have read or have had read to me these discharge instructions for my   procedure and have received a written copy.  I understand these   instructions and will follow-up with my physician if I have any questions.     __________________________________       _____________________________________  Nurse Signature                                          Patient/Designated   Responsible Party Signature  Vinod Epperson III, MD  11/20/2018 3:21:20 PM  This report has been verified and signed electronically.  [employer]

## 2018-11-20 NOTE — ANESTHESIA POSTPROCEDURE EVALUATION
"Anesthesia Post Evaluation    Patient: Hira Riggs    Procedure(s) Performed: Procedure(s) (LRB):  ESOPHAGOGASTRODUODENOSCOPY (EGD) (N/A)  COLONOSCOPY (N/A)    Final Anesthesia Type: MAC  Patient location during evaluation: GI PACU  Patient participation: Yes- Able to Participate  Level of consciousness: awake and alert  Post-procedure vital signs: reviewed and stable  Pain management: adequate  Airway patency: patent  PONV status at discharge: No PONV  Anesthetic complications: no      Cardiovascular status: blood pressure returned to baseline  Respiratory status: unassisted  Hydration status: euvolemic  Follow-up not needed.        Visit Vitals  /77 (BP Location: Left arm, Patient Position: Lying)   Pulse 96   Temp 36.6 °C (97.8 °F) (Oral)   Resp 16   Ht 5' 5" (1.651 m)   Wt 73 kg (161 lb)   SpO2 (!) 94%   BMI 26.79 kg/m²       Pain/Sanya Score: Presence of Pain: denies (11/20/2018 12:35 PM)        "

## 2018-11-20 NOTE — DISCHARGE SUMMARY
[facility]  Brief Operative Note     SUMMARY     Surgery Date: 11/20/2018     Surgeon(s) and Role:     * Vinod Epperson III, MD - Primary    Assisting Surgeon: None    Pre-op Diagnosis:  Iron deficiency anemia due to chronic blood loss [D50.0]    Post-op Diagnosis:  Post-Op Diagnosis Codes:     * Iron deficiency anemia due to chronic blood loss [D50.0]      - Gastritis      - Duodenal Ulcer Disease  Procedure(s) (LRB):  ESOPHAGOGASTRODUODENOSCOPY (EGD) (N/A)  COLONOSCOPY (N/A)    Anesthesia: Choice    Description of the findings of the procedure: Procedures completed. See Procedure note for full details.    Findings/Key Components: Procedures completed. See Procedure note for full details.    Prosthetics/Devices: None    Estimated Blood Loss: * No values recorded between 11/20/2018 12:00 AM and 11/20/2018  3:27 PM *         Specimens:   Specimen (12h ago, onward)    Start     Ordered    11/20/18 1417  Specimen to Pathology - Surgery  Once     Comments:  1.  Duodenal ulcer biopsy 2.  Antrum biopsy,Gastritis -R/O H pylori     Start Status   11/20/18 1417 Collected (11/20/18 1501)       11/20/18 1501          Discharge Note    SUMMARY     Admit Date: 11/20/2018    Discharge Date and Time: 11/20/2018    Hospital Course (synopsis of major diagnoses, care, treatment, and services provided during the course of the hospital stay):  Procedures completed. See Procedure note for full details. Discharge patient when discharge criteria met.    Final Diagnosis: Post-Op Diagnosis Codes:     * Iron deficiency anemia due to chronic blood loss [D50.0]      Gastritis      Duodenal Ulcer disease  Disposition: Discharge patient when discharge criteria met.    Follow Up/Patient Instructions:       Medications:  Reconciled Home Medications:   Current Discharge Medication List      STOP taking these medications       sodium,potassium,mag sulfates (SUPREP BOWEL PREP KIT) 17.5-3.13-1.6 gram SolR Comments:   Reason for  Stopping:              Discharge Procedure Orders   Diet general

## 2018-11-20 NOTE — H&P
Short Stay Endoscopy History and Physical    PCP - Dario Borges MD     Procedure - EGD and Colonoscopy  ASA - 2  Mallampati - per anesthesia  History of Anesthesia problems - no  Family history Anesthesia problems -  no     HPI:  This is a 32 y.o.male here for evaluation of : Iron Deficiency Anemia    Reflux - no  Dysphagia - no  Abdominal pain - no  Diarrhea - no  Anemia - yes  GI bleeding - no  Nausea and vomiting-no  Early satiety-no  aversion to sight or smell of food-no    ROS:  Constitutional: No fevers, chills, No weight loss  ENT: No allergies  CV: No chest pain  Pulm: No cough, No shortness of breath  Ophtho: No vision changes  GI: see HPI  Derm: No rash  Heme: No lymphadenopathy, No bruising  MSK: No arthritis  : No dysuria, No hematuria  Endo: No hot or cold intolerance  Neuro: No syncope, No seizure  Psych: No anxiety, No depression    Medical History:History reviewed. No pertinent past medical history.    Surgical History:History reviewed. No pertinent surgical history.    Family History:  Family History   Problem Relation Age of Onset    Other Other         none.       Social History:  Social History     Socioeconomic History    Marital status:      Spouse name: Not on file    Number of children: Not on file    Years of education: Not on file    Highest education level: Not on file   Social Needs    Financial resource strain: Not on file    Food insecurity - worry: Not on file    Food insecurity - inability: Not on file    Transportation needs - medical: Not on file    Transportation needs - non-medical: Not on file   Occupational History    Not on file   Tobacco Use    Smoking status: Never Smoker    Smokeless tobacco: Never Used   Substance and Sexual Activity    Alcohol use: No    Drug use: No    Sexual activity: Not on file   Other Topics Concern    Not on file   Social History Narrative    Not on file       Allergies: Review of patient's allergies indicates:  No  Known Allergies    Medications:   No current facility-administered medications on file prior to encounter.      No current outpatient medications on file prior to encounter.       Objective Findings:    Vital Signs:  Vitals:    11/20/18 1238   BP: 133/83   Pulse: 83   Resp: 16   Temp: 97.8 °F (36.6 °C)           Physical Exam:  General Appearance: Well appearing in no acute distress  Eyes:    No scleral icterus  ENT: Neck supple, Lips, mucosa, and tongue normal; teeth and gums normal  Lungs: CTA bilaterally in anterior and posterior fields, no wheezes, no crackles.  Heart:  Regular rate, S1, S2 normal, no murmurs heard.  Abdomen: Soft, non tender, non distended with normal bowel sounds. No hepatosplenomegaly, ascites, or mass.  Extremities: No clubbing, cyanosis or edema  Skin: No rash    Labs:  Reviewed    Plan:EGD and Colonoscopy  I have explained the risks and benefits of endoscopy procedures to the patient including but not limited to bleeding, perforation, infection, and death. The patient wishes to proceed.

## 2018-11-20 NOTE — ANESTHESIA PREPROCEDURE EVALUATION
11/20/2018  Hria Riggs is a 32 y.o., male.    Anesthesia Evaluation    I have reviewed the Patient Summary Reports.    I have reviewed the Nursing Notes.   I have reviewed the Medications.     Review of Systems  Anesthesia Hx:  No previous Anesthesia  Denies Family Hx of Anesthesia complications.   Denies Personal Hx of Anesthesia complications.   Social:  No Alcohol Use, Non-Smoker    Hematology/Oncology:  Hematology Normal   Oncology Normal     EENT/Dental:EENT/Dental Normal   Cardiovascular:  Cardiovascular Normal     Pulmonary:  Pulmonary Normal    Renal/:  Renal/ Normal     Musculoskeletal:  Musculoskeletal Normal    Neurological:  Neurology Normal    Dermatological:  Skin Normal        Physical Exam  General:  Well nourished    Airway/Jaw/Neck:  Airway Findings: Mouth Opening: Normal Tongue: Normal  General Airway Assessment: Adult  Mallampati: II  Improves to II with phonation.  TM Distance: Normal, at least 6 cm      Dental:  Dental Findings: In tact        Mental Status:  Mental Status Findings:  Cooperative         Anesthesia Plan  Type of Anesthesia, risks & benefits discussed:  Anesthesia Type:  MAC  Patient's Preference:   Intra-op Monitoring Plan:   Intra-op Monitoring Plan Comments:   Post Op Pain Control Plan:   Post Op Pain Control Plan Comments:   Induction:   IV  Beta Blocker:  Patient is not currently on a Beta-Blocker (No further documentation required).       Informed Consent: Patient understands risks and agrees with Anesthesia plan.  Questions answered. Anesthesia consent signed with patient.  ASA Score: 2     Day of Surgery Review of History & Physical: I have interviewed and examined the patient. I have reviewed the patient's H&P dated:  There are no significant changes. Significant changes noted: Surgeon notified.          Ready For Surgery From Anesthesia Perspective.

## 2018-11-20 NOTE — [employer] PATIENT INSTRUCTIONS
Discharge Summary/Instructions after an Endoscopic Procedure  Patient Name: Hira Riggs  Patient MRN: [id number]  Patient YOB: 1985 Tuesday, November 20, 2018 Vinod Epperson III, MD  RESTRICTIONS:  During your procedure today, you received medications for sedation.  These   medications may affect your judgment, balance and coordination.  Therefore,   for 24 hours, you have the following restrictions:   - DO NOT drive a car, operate machinery, make legal/financial decisions,   sign important papers or drink alcohol.    ACTIVITY:  Today: no heavy lifting, straining or running due to procedural   sedation/anesthesia.  The following day: return to full activity including work.  DIET:  Eat and drink normally unless instructed otherwise.     TREATMENT FOR COMMON SIDE EFFECTS:  - Mild abdominal pain, nausea, belching, bloating or excessive gas:  rest,   eat lightly and use a heating pad.  - Sore Throat: treat with throat lozenges and/or gargle with warm salt   water.  - Because air was used during the procedure, expelling large amounts of air   from your rectum or belching is normal.  - If a bowel prep was taken, you may not have a bowel movement for 1-3 days.    This is normal.  SYMPTOMS TO WATCH FOR AND REPORT TO YOUR PHYSICIAN:  1. Abdominal pain or bloating, other than gas cramps.  2. Chest pain.  3. Back pain.  4. Signs of infection such as: chills or fever occurring within 24 hours   after the procedure.  5. Rectal bleeding, which would show as bright red, maroon, or black stools.   (A tablespoon of blood from the rectum is not serious, especially if   hemorrhoids are present.)  6. Vomiting.  7. Weakness or dizziness.  GO DIRECTLY TO THE NEAREST EMERGENCY ROOM IF YOU HAVE ANY OF THE FOLLOWING:      Difficulty breathing              Chills and/or fever over 101 F   Persistent vomiting and/or vomiting blood   Severe abdominal pain   Severe chest pain   Black, tarry stools   Bleeding- more than one  tablespoon   Any other symptom or condition that you feel may need urgent attention  Your doctor recommends these additional instructions:  If any biopsies were taken, your doctors clinic will contact you in 1 to 2   weeks with any results.  - Discharge patient to home (via wheelchair).   - High fiber diet.   - Continue present medications.   - Repeat colonoscopy in 10 years for screening purposes.   - Return to primary care physician as previously scheduled.   - Discharge patient to home (via wheelchair).   - High fiber diet.   - Continue present medications.   - Repeat colonoscopy in 10 years for screening purposes.   - Return to primary care physician as previously scheduled.  For questions, problems or results please call your physician Vinod Epperson III, MD at Work:  [phone]  If you have any questions about the above instructions, call the GI   department at [phone] or call the endoscopy unit at [phone]   from 7am until 3 pm.  [facility], EMERGENCY ROOM PHONE NUMBER:   [phone]  IF A COMPLICATION OR EMERGENCY SITUATION ARISES AND YOU ARE UNABLE TO REACH   YOUR PHYSICIAN - GO DIRECTLY TO THE EMERGENCY ROOM.  I have read or have had read to me these discharge instructions for my   procedure and have received a written copy.  I understand these   instructions and will follow-up with my physician if I have any questions.     __________________________________       _____________________________________  Nurse Signature                                          Patient/Designated   Responsible Party Signature  Vinod Epperson III, MD  11/20/2018 3:09:54 PM  This report has been verified and signed electronically.  [employer]

## 2018-11-20 NOTE — ANESTHESIA RELEASE NOTE
"Anesthesia Release from PACU Note    Patient: Hira Riggs    Procedure(s) Performed: Procedure(s) (LRB):  ESOPHAGOGASTRODUODENOSCOPY (EGD) (N/A)  COLONOSCOPY (N/A)    Anesthesia type: MAC    Post pain: Adequate analgesia    Post assessment: no apparent anesthetic complications, tolerated procedure well and no evidence of recall    Last Vitals:   Visit Vitals  /77 (BP Location: Left arm, Patient Position: Lying)   Pulse 96   Temp 36.6 °C (97.8 °F) (Oral)   Resp 16   Ht 5' 5" (1.651 m)   Wt 73 kg (161 lb)   SpO2 (!) 94%   BMI 26.79 kg/m²       Post vital signs: stable    Level of consciousness: awake    Nausea/Vomiting: no nausea/no vomiting    Complications: none    Airway Patency: patent    Respiratory: unassisted    Cardiovascular: stable and blood pressure at baseline    Hydration: euvolemic  "

## 2018-11-20 NOTE — TRANSFER OF CARE
"Anesthesia Transfer of Care Note    Patient: Hira Riggs    Procedure(s) Performed: Procedure(s) (LRB):  ESOPHAGOGASTRODUODENOSCOPY (EGD) (N/A)  COLONOSCOPY (N/A)    Patient location: PACU    Anesthesia Type: MAC    Transport from OR: Transported from OR on room air with adequate spontaneous ventilation    Post pain: adequate analgesia    Post assessment: no apparent anesthetic complications    Post vital signs: stable    Level of consciousness: awake    Complications: none    Transfer of care protocol was followed      Last vitals:   Visit Vitals  /77 (BP Location: Left arm, Patient Position: Lying)   Pulse 96   Temp 36.6 °C (97.8 °F) (Oral)   Resp 16   Ht 5' 5" (1.651 m)   Wt 73 kg (161 lb)   SpO2 (!) 94%   BMI 26.79 kg/m²     "

## 2018-12-13 DIAGNOSIS — K26.9 DUODENAL ULCER DISEASE: Primary | ICD-10-CM

## 2018-12-13 DIAGNOSIS — D50.0 IRON DEFICIENCY ANEMIA DUE TO CHRONIC BLOOD LOSS: ICD-10-CM

## 2018-12-13 RX ORDER — BISMUTH SUBCITRATE POTASSIUM, METRONIDAZOLE AND TETRACYCLINE HYDROCHLORIDE 140; 125; 125 MG/1; MG/1; MG/1
3 CAPSULE ORAL
Qty: 120 CAPSULE | Refills: 0 | Status: SHIPPED | OUTPATIENT
Start: 2018-12-13 | End: 2018-12-23

## 2018-12-14 ENCOUNTER — LAB VISIT (OUTPATIENT)
Dept: LAB | Facility: HOSPITAL | Age: 33
End: 2018-12-14
Attending: INTERNAL MEDICINE
Payer: COMMERCIAL

## 2018-12-14 ENCOUNTER — TELEPHONE (OUTPATIENT)
Dept: GASTROENTEROLOGY | Facility: CLINIC | Age: 33
End: 2018-12-14

## 2018-12-14 DIAGNOSIS — D50.9 MICROCYTIC ANEMIA: ICD-10-CM

## 2018-12-14 DIAGNOSIS — I35.1 AORTIC EJECTION MURMUR: ICD-10-CM

## 2018-12-14 DIAGNOSIS — D50.0 IRON DEFICIENCY ANEMIA DUE TO CHRONIC BLOOD LOSS: ICD-10-CM

## 2018-12-14 LAB
BASOPHILS # BLD AUTO: 0.03 K/UL
BASOPHILS NFR BLD: 0.3 %
DIFFERENTIAL METHOD: ABNORMAL
EOSINOPHIL # BLD AUTO: 0.2 K/UL
EOSINOPHIL NFR BLD: 2.1 %
ERYTHROCYTE [DISTWIDTH] IN BLOOD BY AUTOMATED COUNT: 15.8 %
FERRITIN SERPL-MCNC: 23 NG/ML
HCT VFR BLD AUTO: 45.9 %
HGB BLD-MCNC: 14.5 G/DL
IRON SERPL-MCNC: 113 UG/DL
LYMPHOCYTES # BLD AUTO: 3.1 K/UL
LYMPHOCYTES NFR BLD: 35.7 %
MCH RBC QN AUTO: 22.7 PG
MCHC RBC AUTO-ENTMCNC: 31.6 G/DL
MCV RBC AUTO: 72 FL
MONOCYTES # BLD AUTO: 0.7 K/UL
MONOCYTES NFR BLD: 7.9 %
NEUTROPHILS # BLD AUTO: 4.7 K/UL
NEUTROPHILS NFR BLD: 54.1 %
PLATELET # BLD AUTO: 173 K/UL
PLATELET BLD QL SMEAR: ABNORMAL
PMV BLD AUTO: 11.5 FL
RBC # BLD AUTO: 6.4 M/UL
SATURATED IRON: 24 %
TOTAL IRON BINDING CAPACITY: 472 UG/DL
TOXIC GRANULES BLD QL SMEAR: PRESENT
TRANSFERRIN SERPL-MCNC: 319 MG/DL
WBC # BLD AUTO: 8.65 K/UL

## 2018-12-14 PROCEDURE — 83540 ASSAY OF IRON: CPT

## 2018-12-14 PROCEDURE — 36415 COLL VENOUS BLD VENIPUNCTURE: CPT | Mod: PO

## 2018-12-14 PROCEDURE — 83021 HEMOGLOBIN CHROMOTOGRAPHY: CPT

## 2018-12-14 PROCEDURE — 82728 ASSAY OF FERRITIN: CPT

## 2018-12-14 PROCEDURE — 85025 COMPLETE CBC W/AUTO DIFF WBC: CPT | Mod: PO

## 2018-12-14 NOTE — TELEPHONE ENCOUNTER
----- Message from Katia Perales sent at 12/14/2018  9:43 AM CST -----  Contact: pt  Pt returning nurse call, please call pt @ [phone].

## 2018-12-18 LAB
HGB A2 MFR BLD HPLC: 2.5 %
HGB FRACT BLD ELPH-IMP: NORMAL
HGB FRACT BLD ELPH-IMP: NORMAL

## 2018-12-19 ENCOUNTER — OFFICE VISIT (OUTPATIENT)
Dept: HEMATOLOGY/ONCOLOGY | Facility: CLINIC | Age: 33
End: 2018-12-19
Payer: COMMERCIAL

## 2018-12-19 VITALS
DIASTOLIC BLOOD PRESSURE: 83 MMHG | RESPIRATION RATE: 16 BRPM | SYSTOLIC BLOOD PRESSURE: 139 MMHG | HEIGHT: 65 IN | HEART RATE: 65 BPM | OXYGEN SATURATION: 99 % | BODY MASS INDEX: 28.32 KG/M2 | WEIGHT: 170 LBS

## 2018-12-19 DIAGNOSIS — D50.0 IRON DEFICIENCY ANEMIA DUE TO CHRONIC BLOOD LOSS: Primary | ICD-10-CM

## 2018-12-19 DIAGNOSIS — D64.9 ANEMIA, UNSPECIFIED TYPE: ICD-10-CM

## 2018-12-19 DIAGNOSIS — A04.8 H. PYLORI INFECTION: ICD-10-CM

## 2018-12-19 PROCEDURE — 99999 PR PBB SHADOW E&M-EST. PATIENT-LVL III: CPT | Mod: PBBFAC,,, | Performed by: NURSE PRACTITIONER

## 2018-12-19 PROCEDURE — 99214 OFFICE O/P EST MOD 30 MIN: CPT | Mod: S$GLB,,, | Performed by: NURSE PRACTITIONER

## 2018-12-19 NOTE — PROGRESS NOTES
Subjective:      Patient ID: Hira Riggs is a 33 y.o. male.    Chief Complaint: Lab discussion    HPI:  Patient is a 33 year old male who presents today for follow up on his recent diagnosis of iron deficiency anemia.  He had recent upper and lower endoscopies and was diagnosed with H. Pylori.  Colonoscopy was normal and he is to repeat in 10 years.  Latest labs show that patient is no longer anemic or iron deficient.  He states that he had begun taking oral iron daily, but has stopped due to GI upset.  He states he has not begun taking the abx therapy for H. Pylori yet because the pharmacy is out of this medication currently, but will be receiving soon.     Social History     Socioeconomic History    Marital status:      Spouse name: Not on file    Number of children: Not on file    Years of education: Not on file    Highest education level: Not on file   Social Needs    Financial resource strain: Not on file    Food insecurity - worry: Not on file    Food insecurity - inability: Not on file    Transportation needs - medical: Not on file    Transportation needs - non-medical: Not on file   Occupational History    Not on file   Tobacco Use    Smoking status: Never Smoker    Smokeless tobacco: Never Used   Substance and Sexual Activity    Alcohol use: No    Drug use: No    Sexual activity: Not on file   Other Topics Concern    Not on file   Social History Narrative    Not on file       Family History   Problem Relation Age of Onset    Other Other         none.       Past Surgical History:   Procedure Laterality Date    COLONOSCOPY N/A 11/20/2018    Procedure: COLONOSCOPY;  Surgeon: Vinod Epperson III, MD;  Location: [facility];  Service: Endoscopy;  Laterality: N/A;    COLONOSCOPY N/A 11/20/2018    Performed by [facility]od [facility]erson III, [facility] at [facility] ENDO    ESOPHAGOGASTRODUODENOSCOPY N/A 11/20/2018    Procedure: ESOPHAGOGASTRODUODENOSCOPY (EGD);  Surgeon: Vinod Epperson III, MD;   Location: [facility];  Service: Endoscopy;  Laterality: N/A;    ESOPHAGOGASTRODUODENOSCOPY (EGD) N/A 11/20/2018    Performed by Vinod Epperson III, MD at [facility]       No past medical history on file.    Review of Systems   Constitutional: Negative for chills, fatigue and fever.   HENT: Negative for nosebleeds and trouble swallowing.    Respiratory: Negative for chest tightness and shortness of breath.    Cardiovascular: Negative for chest pain and palpitations.   Gastrointestinal: Positive for abdominal pain (occasional). Negative for anal bleeding, blood in stool, nausea and vomiting.   Endocrine: Negative for cold intolerance and heat intolerance.   Genitourinary: Negative for dysuria and hematuria.   Skin: Negative for rash and wound.   Neurological: Negative for dizziness, syncope, light-headedness and headaches.   Hematological: Negative for adenopathy. Does not bruise/bleed easily.   Psychiatric/Behavioral: Negative for confusion and decreased concentration.             Medication List           Accurate as of 12/19/18 12:51 PM. If you have any questions, ask your nurse or doctor.               CONTINUE taking these medications    bismuth-metronidazole-tetracycline 140-125-125 mg per capsule  Commonly known as:  PLYERA  Take 3 capsules by mouth 4 (four) times daily before meals and nightly. for 10 days     pantoprazole 40 MG tablet  Commonly known as:  PROTONIX  Take 1 tablet (40 mg total) by mouth once daily.             Objective:   There were no vitals filed for this visit.    Physical Exam   Constitutional: He is oriented to person, place, and time. Vital signs are normal. He appears well-developed and well-nourished.  Non-toxic appearance. He does not have a sickly appearance. He does not appear ill. No distress.   HENT:   Head: Normocephalic and atraumatic.   Right Ear: External ear normal.   Left Ear: External ear normal.   Nose: Nose normal.   Eyes: Conjunctivae, EOM and lids are normal. Right eye  exhibits no discharge. Left eye exhibits no discharge. No scleral icterus.   Cardiovascular: Normal rate, regular rhythm, S1 normal, S2 normal and normal heart sounds. Exam reveals no gallop and no friction rub.   No murmur heard.  Pulmonary/Chest: Effort normal and breath sounds normal. No accessory muscle usage or stridor. No apnea, no tachypnea and no bradypnea. No respiratory distress. He has no decreased breath sounds.   Abdominal: Soft. Normal appearance. He exhibits no distension.   Musculoskeletal: Normal range of motion. He exhibits no edema.   Neurological: He is alert and oriented to person, place, and time.   Skin: Skin is warm, dry and intact. Capillary refill takes less than 2 seconds. No bruising, no lesion and no rash noted. He is not diaphoretic. No pallor.   Psychiatric: He has a normal mood and affect. His speech is normal and behavior is normal. Judgment and thought content normal. Cognition and memory are normal. He is attentive.   Vitals reviewed.      Assessment:     Problem List Items Addressed This Visit     None          Plan:   There are no diagnoses linked to this encounter.    He will follow up in 6 months with labs prior including - CBC, ferritin, TIBC and iron, and alpha globin gene rearrangement test.    I will review assessment/plan with collaborating physician.    Thank You,  LUIS MANUEL Judge

## 2019-02-14 ENCOUNTER — TELEPHONE (OUTPATIENT)
Dept: GASTROENTEROLOGY | Facility: CLINIC | Age: 34
End: 2019-02-14

## 2019-02-14 NOTE — TELEPHONE ENCOUNTER
Patient is calling today to see if he can get the breakdown for Pylera sent to pharmacy. Patient was tested postive for H-pylori in December but he said medication was not in stock then. Then since the new year has started the Pylera is 1000 dollars. Please send in medication to [facility].

## 2019-02-14 NOTE — TELEPHONE ENCOUNTER
----- Message from Laverne Serrato sent at 2/14/2019  8:38 AM CST -----  Contact: Patient  Type:  Needs Medical Advice    Who Called: patient  Symptoms (please be specific):   How long has patient had these symptoms:    Pharmacy name and phone #:    Would the patient rather a call back or a response via [facility]? call  Best Call Back Number: [phone]  Additional Information: Patient has a question regarding the medication Pylera

## 2019-02-16 DIAGNOSIS — B96.81 HELICOBACTER PYLORI GASTRITIS (CHRONIC GASTRITIS): Primary | ICD-10-CM

## 2019-02-16 DIAGNOSIS — K29.50 HELICOBACTER PYLORI GASTRITIS (CHRONIC GASTRITIS): Primary | ICD-10-CM

## 2019-02-16 RX ORDER — METRONIDAZOLE 500 MG/1
500 TABLET ORAL EVERY 8 HOURS
Qty: 30 TABLET | Refills: 0 | Status: SHIPPED | OUTPATIENT
Start: 2019-02-16 | End: 2021-01-25 | Stop reason: ALTCHOICE

## 2019-02-16 RX ORDER — TETRACYCLINE HYDROCHLORIDE 500 MG/1
500 CAPSULE ORAL 2 TIMES DAILY
Qty: 20 CAPSULE | Refills: 0 | Status: SHIPPED | OUTPATIENT
Start: 2019-02-16 | End: 2021-01-25 | Stop reason: ALTCHOICE

## 2019-02-19 ENCOUNTER — TELEPHONE (OUTPATIENT)
Dept: GASTROENTEROLOGY | Facility: CLINIC | Age: 34
End: 2019-02-19

## 2019-02-21 ENCOUNTER — TELEPHONE (OUTPATIENT)
Dept: GASTROENTEROLOGY | Facility: CLINIC | Age: 34
End: 2019-02-21

## 2019-02-21 NOTE — TELEPHONE ENCOUNTER
----- Message from Dorie Mir sent at 2/21/2019 12:07 PM CST -----  Type:  Patient Returning Call    Who Called: deloris Davila  Who Left Message for Patient: Dr Epperson's office  Does the patient know what this is regarding?: prescriptions  Would the patient rather a call back or a response via [facility]? Call again  Best Call Back Number: [phone]  Additional Information: Please call again

## 2019-02-21 NOTE — TELEPHONE ENCOUNTER
Called patient and  Explained to patient new medications called to pharmacy. Patient verbalized understanding.

## 2019-03-06 NOTE — DISCHARGE INSTRUCTIONS
Understanding Gastric Ulcers    A gastric ulcer is an open sore in the stomach lining. It is sometimes called a peptic ulcer. This is a more general term for ulcers that may be in the stomach or the upper part of the small intestine. Ulcers can cause pain. But they may also have no symptoms for a long time.  What causes gastric ulcers?  Gastric ulcers have a few common causes. To find the cause of your ulcer, your healthcare provider will give you an exam and take your health history. He or she may also order some tests. The main causes of gastric ulcers include:  · Infection with the H. pylori (Helicobacter pylori) bacteria. This damages the stomach lining. Digestive juices can then harm the digestive tract.  · Long-term use of some over-the-counter pain medicines. This reduces the bodys ability to protect the stomach from damage.  Other causes of gastric ulcers include:  · Heavy alcohol use  · Having a family history of ulcers  · In rare cases, a tumor in the digestive tract may cause an ulcer  Symptoms of a gastric ulcer  Ulcer symptoms may appear and then go away for a time. Symptoms of a gastric ulcer may include:  · Stomach pain, often a dull or burning feeling toward the top of your belly  · Feeling full or bloated  · Heartburn or acid reflux  · Upset stomach (nausea) or vomiting  · Vomiting blood  · Lack of appetite  · Weight loss  · Black stool  · Red blood in the stool  Treatment for a gastric ulcer  Treatment for gastric ulcers may depend on what is causing them. Treatment may include:  · Not using over-the-counter medicines. You will likely need to stop taking these medicines. But in some cases these medicines cant be safely stopped. Check with your healthcare provider to see what is best for you.   · Taking medicines to ease symptoms. These medicines may help to reduce the amount of acid your stomach makes. They also may help coat your stomach lining.  · Taking antibiotics. If your ulcer was caused  by H. pylori, your provider will likely prescribe antibiotics to get rid of the infection.  · Having an endoscopy. This is often done to check the stomach and diagnose the ulcer. In some cases it can also treat the ulcer. It involves passing a flexible tube through your mouth into your stomach and small intestine.  · Using a tube (catheter) to stop the bleeding. A thin tube is passed into one of your blood vessels. Special tools are used to help stop the bleeding.  · Having surgery. You often may need this if the ulcer has caused severe symptoms.  Making some lifestyle changes can reduce ulcer symptoms. It may also prevent more damage to your digestive tract. These changes include:  · Not taking over-the-counter pain medicines. Talk with your provider about using another type of pain reliever.  · Not taking aspirin unless your provider has advised it  · Limiting the amount of alcohol you drink  · Quitting smoking  Possible complications of a gastric ulcer  Gastric ulcers can have serious complications. These can include:  · Bleeding into the stomach  · A hole (perforation) in the stomach  · A blockage that interferes with food moving from your stomach to the small intestine  An ongoing infection with H. pylori may be a risk factor for stomach cancer. This is one reason it is important to get rid of this bacteria.  When to call your healthcare provider  Call your healthcare provider right away if you have any of these:  · Vomiting blood, or vomit that looks like coffee grounds  · Bloody, black, or tarry-looking stools  · Fever of 100.4°F (38°C) or higher, or as directed  · Pain that gets worse  · Symptoms that dont get better with treatment, or symptoms that get worse  · New symptoms   Date Last Reviewed: 5/1/2016  © 8973-1205 [employer]. [address] [place] [address], [place] [address]. All rights reserved. This information is not intended as a substitute for professional medical care. Always follow your  healthcare professional's instructions.        Gastritis (Adult)    Gastritis is inflammation and irritation of the stomach lining. It can be present for a short time (acute) or be long lasting (chronic). Gastritis is often caused by infection with bacteria called H pylori. More than a third of people in the [place] have this bacteria in their bodies. In many cases, H pylori causes no problems or symptoms. In some people, though, the infection irritates the stomach lining and causes gastritis. Other causes of stomach irritation include drinking alcohol or taking pain-relieving medicines called NSAIDs (such as aspirin or ibuprofen).   Symptoms of gastritis can include:  · Abdominal pain or bloating  · Loss of appetite  · Nausea or vomiting  · Vomiting blood or having black stools  · Feeling more tired than usual  An inflamed and irritated stomach lining is more likely to develop a sore called an ulcer. To help prevent this, gastritis should be treated.  Home care  If needed, medicines may be prescribed. If you have H pylori infection, treating it will likely relieve your symptoms. Other changes can help reduce stomach irritation and help it heal.  · If you have been prescribed medicines for H pylori infection, take them as directed. Take all of the medicine until it is finished or your healthcare provider tells you to stop, even if you feel better.  · Your healthcare provider may recommend avoiding NSAIDs. If you take daily aspirin for your heart or other medical reasons, do not stop without talking to your healthcare provider first.  · Avoid drinking alcohol.  · Stop smoking. Smoking can irritate the stomach and delay healing. As much as possible, stay away from second hand smoke.  Follow-up care  Follow up with your healthcare provider, or as advised by our staff. Testing may be needed to check for inflammation or an ulcer.  When to seek medical advice  Call your healthcare provider for any of the following:  · Stomach  pain that gets worse or moves to the lower right abdomen (appendix area)  · Chest pain that appears or gets worse, or spreads to the back, neck, shoulder, or arm  · Frequent vomiting (cant keep down liquids)  · Blood in the stool or vomit (red or black in color)  · Feeling weak or dizzy  · Fever of 100.4ºF (38ºC) or higher, or as directed by your healthcare provider  Date Last Reviewed: 6/22/2015  © 8811-0283 [email or web address]. [address] [place] [address]. All rights reserved. This information is not intended as a substitute for professional medical care. Always follow your healthcare professional's instructions.         No

## 2019-03-11 ENCOUNTER — TELEPHONE (OUTPATIENT)
Dept: GASTROENTEROLOGY | Facility: CLINIC | Age: 34
End: 2019-03-11

## 2019-03-11 NOTE — TELEPHONE ENCOUNTER
----- Message from Kaleigh Hernandez sent at 3/11/2019 11:12 AM CDT -----  Contact: self   Type:  Patient Returning Call    Who Called:patient  Who Left Message for Patient:dr ramirez nurse  Does the patient know what this is regarding?:unsure  Would the patient rather a call back or a response via [facility]? call  Best Call Back Number:  Additional Information: [phone]

## 2019-03-11 NOTE — TELEPHONE ENCOUNTER
----- Message from Elaine Hassan sent at 3/11/2019  9:46 AM CDT -----  Contact: patient  Patient requesting a call back regarding medications.tetracycline (ACHROMYCIN,SUMYCIN) 500 MG capsule/metroNIDAZOLE (FLAGYL) 500 MG/tabletpantoprazole (PROTONIX) 40 MG tablet.  He would like to know how to take medications. Please call back at [phone]    Thanks,  Elaine Hassan

## 2019-03-11 NOTE — TELEPHONE ENCOUNTER
Called and spoke with patient and medication instruction were explained. Patient verbalized understanding.    Recommendation for patient not to take any oral antihistamines was made    The discussion regarding optimal way to swallow    A degree of coming to place of acceptance regarding his present condition and situation along with the advantages of having been in all probability cured of his cancer was discussed    Follow-up in 4 months

## 2019-07-22 ENCOUNTER — LAB VISIT (OUTPATIENT)
Dept: LAB | Facility: HOSPITAL | Age: 34
End: 2019-07-22
Attending: NURSE PRACTITIONER
Payer: COMMERCIAL

## 2019-07-22 DIAGNOSIS — D64.9 ANEMIA, UNSPECIFIED TYPE: ICD-10-CM

## 2019-07-22 PROCEDURE — 81269 HBA1/HBA2 GENE DUP/DEL VRNTS: CPT

## 2019-07-29 ENCOUNTER — OFFICE VISIT (OUTPATIENT)
Dept: HEMATOLOGY/ONCOLOGY | Facility: CLINIC | Age: 34
End: 2019-07-29
Payer: COMMERCIAL

## 2019-07-29 VITALS
WEIGHT: 173.5 LBS | HEART RATE: 75 BPM | SYSTOLIC BLOOD PRESSURE: 135 MMHG | OXYGEN SATURATION: 99 % | TEMPERATURE: 98 F | BODY MASS INDEX: 28.91 KG/M2 | DIASTOLIC BLOOD PRESSURE: 82 MMHG | HEIGHT: 65 IN | RESPIRATION RATE: 18 BRPM

## 2019-07-29 DIAGNOSIS — E61.1 IRON DEFICIENCY: ICD-10-CM

## 2019-07-29 DIAGNOSIS — R71.8 RBC MICROCYTOSIS: Primary | ICD-10-CM

## 2019-07-29 PROCEDURE — 99999 PR PBB SHADOW E&M-EST. PATIENT-LVL III: ICD-10-PCS | Mod: PBBFAC,,, | Performed by: NURSE PRACTITIONER

## 2019-07-29 PROCEDURE — 99213 PR OFFICE/OUTPT VISIT, EST, LEVL III, 20-29 MIN: ICD-10-PCS | Mod: S$GLB,,, | Performed by: NURSE PRACTITIONER

## 2019-07-29 PROCEDURE — 99999 PR PBB SHADOW E&M-EST. PATIENT-LVL III: CPT | Mod: PBBFAC,,, | Performed by: NURSE PRACTITIONER

## 2019-07-29 PROCEDURE — 99213 OFFICE O/P EST LOW 20 MIN: CPT | Mod: S$GLB,,, | Performed by: NURSE PRACTITIONER

## 2019-07-29 NOTE — PROGRESS NOTES
Subjective:      Patient ID: Hira Riggs is a 33 y.o. male.    Chief Complaint: Lab discussion    HPI:  Patient is a 33 year old male who presents today for follow up on his iron deficiency anemia with diagnosed H. Pylori infection found on endoscopy.  Colonoscopy was normal and he is to repeat in 10 years.  Latest labs show that patient is no longer anemic; however is slightly iron deficient with saturated iron of 17%.  He is currently not taking oral iron - took in the past and caused GI upset. He states that he completed antibiotic therapy for treatment of H. Pylori.  He denies melena or hematochezia or any unusual blood loss.  He denies abdominal discomfort.      He persists with a high RBC and microcytosis.  Presumed alpha thalassemia trait- lab pending.      Social History     Socioeconomic History    Marital status:      Spouse name: Not on file    Number of children: Not on file    Years of education: Not on file    Highest education level: Not on file   Occupational History    Not on file   Social Needs    Financial resource strain: Not on file    Food insecurity:     Worry: Not on file     Inability: Not on file    Transportation needs:     Medical: Not on file     Non-medical: Not on file   Tobacco Use    Smoking status: Never Smoker    Smokeless tobacco: Never Used   Substance and Sexual Activity    Alcohol use: No    Drug use: No    Sexual activity: Not on file   Lifestyle    Physical activity:     Days per week: Not on file     Minutes per session: Not on file    Stress: Not on file   Relationships    Social connections:     Talks on phone: Not on file     Gets together: Not on file     Attends Taoist service: Not on file     Active member of club or organization: Not on file     Attends meetings of clubs or organizations: Not on file     Relationship status: Not on file   Other Topics Concern    Not on file   Social History Narrative    Not on file       Family  History   Problem Relation Age of Onset    Other Other         none.       Past Surgical History:   Procedure Laterality Date    COLONOSCOPY N/A 11/20/2018    Performed [facility] at [facility]    ESOPHAGOGASTRODUODENOSCOPY (EGD) N/A 11/20/2018    Performed by Vinod Epperson III, MD at [facility] END[facility]       History reviewed. No pertinent past medical history.    Review of Systems   Constitutional: Negative for chills, fatigue and fever.   HENT: Negative for nosebleeds and trouble swallowing.    Respiratory: Negative for chest tightness and shortness of breath.    Cardiovascular: Negative for chest pain and palpitations.   Gastrointestinal: Negative for abdominal pain, anal bleeding, blood in stool, nausea and vomiting.   Endocrine: Negative for cold intolerance and heat intolerance.   Genitourinary: Negative for dysuria and hematuria.   Skin: Negative for rash and wound.   Neurological: Negative for dizziness, syncope, light-headedness and headaches.   Hematological: Negative for adenopathy. Does not bruise/bleed easily.   Psychiatric/Behavioral: Negative for confusion and decreased concentration.          Medication List with Changes/Refills   Current Medications    METRONIDAZOLE (FLAGYL) 500 MG TABLET    Take 1 tablet (500 mg total) by mouth every 8 (eight) hours.    PANTOPRAZOLE (PROTONIX) 40 MG TABLET    Take 1 tablet (40 mg total) by mouth once daily.    TETRACYCLINE (ACHROMYCIN,SUMYCIN) 500 MG CAPSULE    Take 1 capsule (500 mg total) by mouth 2 (two) times daily.        Objective:     Vitals:    07/29/19 0918   BP: 135/82   Pulse: 75   Resp: 18   Temp: 98 °F (36.7 °C)       Physical Exam   Constitutional: He is oriented to person, place, and time. Vital signs are normal. He appears well-developed and well-nourished.  Non-toxic appearance. He does not have a sickly appearance. He does not appear ill. No distress.   HENT:   Head: Normocephalic and atraumatic.   Right Ear: External ear normal.    Left Ear: External ear normal.   Nose: Nose normal.   Eyes: Conjunctivae, EOM and lids are normal. Right eye exhibits no discharge. Left eye exhibits no discharge. No scleral icterus.   Cardiovascular: Normal rate, regular rhythm, S1 normal, S2 normal and normal heart sounds. Exam reveals no gallop and no friction rub.   No murmur heard.  Pulmonary/Chest: Effort normal and breath sounds normal. No accessory muscle usage or stridor. No apnea, no tachypnea and no bradypnea. No respiratory distress. He has no decreased breath sounds.   Abdominal: Soft. Normal appearance. He exhibits no distension.   Musculoskeletal: Normal range of motion. He exhibits no edema.   Neurological: He is alert and oriented to person, place, and time.   Skin: Skin is warm, dry and intact. Capillary refill takes less than 2 seconds. No bruising, no lesion and no rash noted. He is not diaphoretic. No pallor.   Psychiatric: He has a normal mood and affect. His speech is normal and behavior is normal. Judgment and thought content normal. Cognition and memory are normal. He is attentive.   Vitals reviewed.      Assessment:     Problem List Items Addressed This Visit     None      Visit Diagnoses     RBC microcytosis    -  Primary    Iron deficiency            Lab Results   Component Value Date    WBC 6.60 07/22/2019    HGB 14.2 07/22/2019    HCT 46.6 07/22/2019    MCV 72 (L) 07/22/2019     07/22/2019     CMP  Sodium   Date Value Ref Range Status   07/22/2019 139 136 - 145 mmol/L Final     Potassium   Date Value Ref Range Status   07/22/2019 3.9 3.5 - 5.1 mmol/L Final     Chloride   Date Value Ref Range Status   07/22/2019 106 95 - 110 mmol/L Final     CO2   Date Value Ref Range Status   07/22/2019 26 23 - 29 mmol/L Final     Glucose   Date Value Ref Range Status   07/22/2019 95 70 - 110 mg/dL Final     BUN, Bld   Date Value Ref Range Status   07/22/2019 13 6 - 20 mg/dL Final     Creatinine   Date Value Ref Range Status   07/22/2019 0.9  0.5 - 1.4 mg/dL Final     Calcium   Date Value Ref Range Status   07/22/2019 9.7 8.7 - 10.5 mg/dL Final     Total Protein   Date Value Ref Range Status   07/22/2019 7.6 6.0 - 8.4 g/dL Final     Albumin   Date Value Ref Range Status   07/22/2019 4.3 3.5 - 5.2 g/dL Final     Total Bilirubin   Date Value Ref Range Status   07/22/2019 0.6 0.1 - 1.0 mg/dL Final     Comment:     For infants and newborns, interpretation of results should be based  on gestational age, weight and in agreement with clinical  observations.  Premature Infant recommended reference ranges:  Up to 24 hours.............<8.0 mg/dL  Up to 48 hours............<12.0 mg/dL  3-5 days..................<15.0 mg/dL  6-29 days.................<15.0 mg/dL       Alkaline Phosphatase   Date Value Ref Range Status   07/22/2019 73 55 - 135 U/L Final     AST   Date Value Ref Range Status   07/22/2019 29 10 - 40 U/L Final     ALT   Date Value Ref Range Status   07/22/2019 66 (H) 10 - 44 U/L Final     Anion Gap   Date Value Ref Range Status   07/22/2019 7 (L) 8 - 16 mmol/L Final     eGFR if    Date Value Ref Range Status   07/22/2019 >60 >60 mL/min/1.73 m^2 Final     eGFR if non    Date Value Ref Range Status   07/22/2019 >60 >60 mL/min/1.73 m^2 Final     Comment:     Calculation used to obtain the estimated glomerular filtration  rate (eGFR) is the CKD-EPI equation.        Lab Results   Component Value Date    IRON 78 07/22/2019    TIBC 469 (H) 07/22/2019    FERRITIN 21 07/22/2019         Plan:   RBC microcytosis    Iron deficiency    Other orders  -     Cancel: CBC auto differential; Future; Expected date: 07/29/2019  -     Cancel: Comprehensive metabolic panel; Future; Expected date: 07/29/2019  -     Cancel: Ferritin; Future; Expected date: 07/29/2019  -     Cancel: Iron and TIBC; Future; Expected date: 07/29/2019    He will continue follow up with primary care.  He was told if he develops anemia again or if start with abdominal  pain to follow up with GI for repeat stool testing for recurrent H. Pylori infection and us to determine if additional intervention is needed.  Alpha thalassemia testing results pending.     Thank You,  BLANCHE JudgeC

## 2019-07-31 LAB
ALPHA GLOBIN RELEASED BY: NORMAL
ALPHA-GLOBIN SPECIMEN: NORMAL
GENETICIST REVIEW: NORMAL
HBA1 GENE MUT ANL BLD/T: NORMAL
HBA1 GENE MUT ANL BLD/T: NORMAL
REF LAB TEST METHOD: NORMAL
SERVICE CMNT-IMP: NORMAL
SPECIMEN SOURCE: NORMAL

## 2019-10-08 ENCOUNTER — OFFICE VISIT (OUTPATIENT)
Dept: PODIATRY | Facility: CLINIC | Age: 34
End: 2019-10-08
Payer: COMMERCIAL

## 2019-10-08 ENCOUNTER — HOSPITAL ENCOUNTER (OUTPATIENT)
Dept: RADIOLOGY | Facility: HOSPITAL | Age: 34
Discharge: HOME | End: 2019-10-08
Attending: PODIATRIST
Payer: COMMERCIAL

## 2019-10-08 VITALS
DIASTOLIC BLOOD PRESSURE: 80 MMHG | HEIGHT: 65 IN | HEART RATE: 74 BPM | BODY MASS INDEX: 28.1 KG/M2 | SYSTOLIC BLOOD PRESSURE: 117 MMHG | WEIGHT: 168.63 LBS

## 2019-10-08 DIAGNOSIS — L08.9 TOE INFLAMMATION: Primary | ICD-10-CM

## 2019-10-08 DIAGNOSIS — M79.674 PAIN OF TOE OF RIGHT FOOT: ICD-10-CM

## 2019-10-08 PROCEDURE — 73630 X-RAY EXAM OF FOOT: CPT | Mod: 26,RT,, | Performed by: RADIOLOGY

## 2019-10-08 PROCEDURE — 73630 XR FOOT COMPLETE 3 VIEW RIGHT: ICD-10-PCS | Mod: 26,RT,, | Performed by: RADIOLOGY

## 2019-10-08 PROCEDURE — 99204 OFFICE O/P NEW MOD 45 MIN: CPT | Mod: S$GLB,,, | Performed by: PODIATRIST

## 2019-10-08 PROCEDURE — 73630 X-RAY EXAM OF FOOT: CPT | Mod: TC,RT

## 2019-10-08 PROCEDURE — 99999 PR PBB SHADOW E&M-EST. PATIENT-LVL III: ICD-10-PCS | Mod: PBBFAC,,, | Performed by: PODIATRIST

## 2019-10-08 PROCEDURE — 99204 PR OFFICE/OUTPT VISIT, NEW, LEVL IV, 45-59 MIN: ICD-10-PCS | Mod: S$GLB,,, | Performed by: PODIATRIST

## 2019-10-08 PROCEDURE — 99999 PR PBB SHADOW E&M-EST. PATIENT-LVL III: CPT | Mod: PBBFAC,,, | Performed by: PODIATRIST

## 2021-01-25 ENCOUNTER — OFFICE VISIT (OUTPATIENT)
Dept: INTERNAL MEDICINE | Facility: CLINIC | Age: 36
End: 2021-01-25
Payer: COMMERCIAL

## 2021-01-25 VITALS
WEIGHT: 173.94 LBS | TEMPERATURE: 95 F | DIASTOLIC BLOOD PRESSURE: 84 MMHG | HEART RATE: 78 BPM | OXYGEN SATURATION: 97 % | BODY MASS INDEX: 28.95 KG/M2 | SYSTOLIC BLOOD PRESSURE: 120 MMHG

## 2021-01-25 DIAGNOSIS — D50.0 IRON DEFICIENCY ANEMIA DUE TO CHRONIC BLOOD LOSS: ICD-10-CM

## 2021-01-25 DIAGNOSIS — Z00.00 ROUTINE GENERAL MEDICAL EXAMINATION AT A HEALTH CARE FACILITY: Primary | ICD-10-CM

## 2021-01-25 PROCEDURE — 99395 PR PREVENTIVE VISIT,EST,18-39: ICD-10-PCS | Mod: S$GLB,,, | Performed by: INTERNAL MEDICINE

## 2021-01-25 PROCEDURE — 99999 PR PBB SHADOW E&M-EST. PATIENT-LVL III: CPT | Mod: PBBFAC,,, | Performed by: INTERNAL MEDICINE

## 2021-01-25 PROCEDURE — 99395 PREV VISIT EST AGE 18-39: CPT | Mod: S$GLB,,, | Performed by: INTERNAL MEDICINE

## 2021-01-25 PROCEDURE — 99999 PR PBB SHADOW E&M-EST. PATIENT-LVL III: ICD-10-PCS | Mod: PBBFAC,,, | Performed by: INTERNAL MEDICINE

## 2021-01-26 ENCOUNTER — LAB VISIT (OUTPATIENT)
Dept: LAB | Facility: HOSPITAL | Age: 36
End: 2021-01-26
Attending: INTERNAL MEDICINE
Payer: COMMERCIAL

## 2021-01-26 DIAGNOSIS — Z00.00 ROUTINE GENERAL MEDICAL EXAMINATION AT A HEALTH CARE FACILITY: ICD-10-CM

## 2021-01-26 LAB
ALBUMIN SERPL BCP-MCNC: 4.4 G/DL (ref 3.5–5.2)
ALP SERPL-CCNC: 70 U/L (ref 55–135)
ALT SERPL W/O P-5'-P-CCNC: 47 U/L (ref 10–44)
ANION GAP SERPL CALC-SCNC: 7 MMOL/L (ref 8–16)
AST SERPL-CCNC: 23 U/L (ref 10–40)
BASOPHILS # BLD AUTO: 0.05 K/UL (ref 0–0.2)
BASOPHILS NFR BLD: 0.6 % (ref 0–1.9)
BILIRUB SERPL-MCNC: 0.7 MG/DL (ref 0.1–1)
BUN SERPL-MCNC: 13 MG/DL (ref 6–20)
CALCIUM SERPL-MCNC: 9.6 MG/DL (ref 8.7–10.5)
CHLORIDE SERPL-SCNC: 106 MMOL/L (ref 95–110)
CHOLEST SERPL-MCNC: 147 MG/DL (ref 120–199)
CHOLEST/HDLC SERPL: 3.1 {RATIO} (ref 2–5)
CO2 SERPL-SCNC: 27 MMOL/L (ref 23–29)
COMPLEXED PSA SERPL-MCNC: 0.56 NG/ML (ref 0–4)
CREAT SERPL-MCNC: 0.9 MG/DL (ref 0.5–1.4)
DIFFERENTIAL METHOD: ABNORMAL
EOSINOPHIL # BLD AUTO: 0.3 K/UL (ref 0–0.5)
EOSINOPHIL NFR BLD: 3.3 % (ref 0–8)
ERYTHROCYTE [DISTWIDTH] IN BLOOD BY AUTOMATED COUNT: 17.6 % (ref 11.5–14.5)
EST. GFR  (AFRICAN AMERICAN): >60 ML/MIN/1.73 M^2
EST. GFR  (NON AFRICAN AMERICAN): >60 ML/MIN/1.73 M^2
GLUCOSE SERPL-MCNC: 94 MG/DL (ref 70–110)
HCT VFR BLD AUTO: 50.1 % (ref 40–54)
HDLC SERPL-MCNC: 47 MG/DL (ref 40–75)
HDLC SERPL: 32 % (ref 20–50)
HGB BLD-MCNC: 15 G/DL (ref 14–18)
IMM GRANULOCYTES # BLD AUTO: 0.02 K/UL (ref 0–0.04)
IMM GRANULOCYTES NFR BLD AUTO: 0.2 % (ref 0–0.5)
LDLC SERPL CALC-MCNC: 80.6 MG/DL (ref 63–159)
LYMPHOCYTES # BLD AUTO: 3.2 K/UL (ref 1–4.8)
LYMPHOCYTES NFR BLD: 38.2 % (ref 18–48)
MCH RBC QN AUTO: 22.2 PG (ref 27–31)
MCHC RBC AUTO-ENTMCNC: 29.9 G/DL (ref 32–36)
MCV RBC AUTO: 74 FL (ref 82–98)
MONOCYTES # BLD AUTO: 0.7 K/UL (ref 0.3–1)
MONOCYTES NFR BLD: 7.7 % (ref 4–15)
NEUTROPHILS # BLD AUTO: 4.2 K/UL (ref 1.8–7.7)
NEUTROPHILS NFR BLD: 50 % (ref 38–73)
NONHDLC SERPL-MCNC: 100 MG/DL
NRBC BLD-RTO: 0 /100 WBC
PLATELET # BLD AUTO: 222 K/UL (ref 150–350)
PMV BLD AUTO: 12.9 FL (ref 9.2–12.9)
POTASSIUM SERPL-SCNC: 3.9 MMOL/L (ref 3.5–5.1)
PROT SERPL-MCNC: 7.9 G/DL (ref 6–8.4)
RBC # BLD AUTO: 6.75 M/UL (ref 4.6–6.2)
SODIUM SERPL-SCNC: 140 MMOL/L (ref 136–145)
TRIGL SERPL-MCNC: 97 MG/DL (ref 30–150)
TSH SERPL DL<=0.005 MIU/L-ACNC: 2.1 UIU/ML (ref 0.4–4)
WBC # BLD AUTO: 8.45 K/UL (ref 3.9–12.7)

## 2021-01-26 PROCEDURE — 84153 ASSAY OF PSA TOTAL: CPT

## 2021-01-26 PROCEDURE — 85025 COMPLETE CBC W/AUTO DIFF WBC: CPT

## 2021-01-26 PROCEDURE — 36415 COLL VENOUS BLD VENIPUNCTURE: CPT

## 2021-01-26 PROCEDURE — 86803 HEPATITIS C AB TEST: CPT

## 2021-01-26 PROCEDURE — 84443 ASSAY THYROID STIM HORMONE: CPT

## 2021-01-26 PROCEDURE — 80053 COMPREHEN METABOLIC PANEL: CPT

## 2021-01-26 PROCEDURE — 80061 LIPID PANEL: CPT

## 2021-01-27 LAB — HCV AB SERPL QL IA: NEGATIVE

## 2021-04-28 ENCOUNTER — PATIENT MESSAGE (OUTPATIENT)
Dept: RESEARCH | Facility: HOSPITAL | Age: 36
End: 2021-04-28

## 2022-02-17 ENCOUNTER — OFFICE VISIT (OUTPATIENT)
Dept: INTERNAL MEDICINE | Facility: CLINIC | Age: 37
End: 2022-02-17
Payer: COMMERCIAL

## 2022-02-17 VITALS
BODY MASS INDEX: 28.83 KG/M2 | WEIGHT: 173.06 LBS | TEMPERATURE: 98 F | SYSTOLIC BLOOD PRESSURE: 126 MMHG | HEIGHT: 65 IN | HEART RATE: 86 BPM | DIASTOLIC BLOOD PRESSURE: 82 MMHG | OXYGEN SATURATION: 96 %

## 2022-02-17 DIAGNOSIS — Z00.00 ROUTINE GENERAL MEDICAL EXAMINATION AT A HEALTH CARE FACILITY: Primary | ICD-10-CM

## 2022-02-17 PROBLEM — J32.1 FRONTAL SINUSITIS: Status: RESOLVED | Noted: 2018-03-05 | Resolved: 2022-02-17

## 2022-02-17 PROCEDURE — 3074F PR MOST RECENT SYSTOLIC BLOOD PRESSURE < 130 MM HG: ICD-10-PCS | Mod: CPTII,S$GLB,, | Performed by: INTERNAL MEDICINE

## 2022-02-17 PROCEDURE — 3079F DIAST BP 80-89 MM HG: CPT | Mod: CPTII,S$GLB,, | Performed by: INTERNAL MEDICINE

## 2022-02-17 PROCEDURE — 3079F PR MOST RECENT DIASTOLIC BLOOD PRESSURE 80-89 MM HG: ICD-10-PCS | Mod: CPTII,S$GLB,, | Performed by: INTERNAL MEDICINE

## 2022-02-17 PROCEDURE — 3008F PR BODY MASS INDEX (BMI) DOCUMENTED: ICD-10-PCS | Mod: CPTII,S$GLB,, | Performed by: INTERNAL MEDICINE

## 2022-02-17 PROCEDURE — 99395 PR PREVENTIVE VISIT,EST,18-39: ICD-10-PCS | Mod: S$GLB,,, | Performed by: INTERNAL MEDICINE

## 2022-02-17 PROCEDURE — 1159F MED LIST DOCD IN RCRD: CPT | Mod: CPTII,S$GLB,, | Performed by: INTERNAL MEDICINE

## 2022-02-17 PROCEDURE — 99999 PR PBB SHADOW E&M-EST. PATIENT-LVL IV: CPT | Mod: PBBFAC,,, | Performed by: INTERNAL MEDICINE

## 2022-02-17 PROCEDURE — 99395 PREV VISIT EST AGE 18-39: CPT | Mod: S$GLB,,, | Performed by: INTERNAL MEDICINE

## 2022-02-17 PROCEDURE — 3008F BODY MASS INDEX DOCD: CPT | Mod: CPTII,S$GLB,, | Performed by: INTERNAL MEDICINE

## 2022-02-17 PROCEDURE — 3074F SYST BP LT 130 MM HG: CPT | Mod: CPTII,S$GLB,, | Performed by: INTERNAL MEDICINE

## 2022-02-17 PROCEDURE — 99999 PR PBB SHADOW E&M-EST. PATIENT-LVL IV: ICD-10-PCS | Mod: PBBFAC,,, | Performed by: INTERNAL MEDICINE

## 2022-02-17 PROCEDURE — 1159F PR MEDICATION LIST DOCUMENTED IN MEDICAL RECORD: ICD-10-PCS | Mod: CPTII,S$GLB,, | Performed by: INTERNAL MEDICINE

## 2022-02-17 NOTE — PROGRESS NOTES
"Subjective:      Patient ID: Hira Riggs is a 36 y.o. male.    Chief Complaint: No chief complaint on file.    HPI     37 yo with   Patient Active Problem List   Diagnosis    Microcytic anemia    Vitamin D deficiency    Iron deficiency anemia due to chronic blood loss     Past Medical History:   Diagnosis Date    Frontal sinusitis 3/5/2018     Here today for annual prev exam.  Compliant with meds without significant side effects. Energy and appetite are good.     Non smoker.     No FDR with colon or prostate cancer.     Past Surgical History:   Procedure Laterality Date    COLONOSCOPY N/A 11/20/2018    Procedure: COLONOSCOPY;  Surgeon: Vinod Epperson III, MD;  Location: [facility];  Service: Endoscopy;  Laterality: N/A;    ESOPHAGOGASTRODUODENOSCOPY N/A 11/20/2018    Procedure: ESOPHAGOGASTRODUODENOSCOPY (EGD);  Surgeon: Vinod Epperson III, MD;  Location: [facility];  Service: Endoscopy;  Laterality: N/A;     Social History     Socioeconomic History    Marital status:    Tobacco Use    Smoking status: Never Smoker    Smokeless tobacco: Never Used   Substance and Sexual Activity    Alcohol use: No    Drug use: No    Sexual activity: Yes     family history includes No Known Problems in his father and mother; Other in his other.    Review of Systems   Constitutional: Negative for chills and fever.   HENT: Negative for ear pain and sore throat.    Respiratory: Negative for cough.    Cardiovascular: Negative for chest pain.   Gastrointestinal: Negative for abdominal pain and blood in stool.   Genitourinary: Negative for dysuria and hematuria.   Neurological: Negative for seizures and syncope.     Objective:   /82   Pulse 86   Temp 97.9 °F (36.6 °C) (Oral)   Ht 5' 5" (1.651 m)   Wt 78.5 kg (173 lb 1 oz)   SpO2 96%   BMI 28.80 kg/m²     Physical Exam  Constitutional:       General: He is not in acute distress.     Appearance: He is well-developed.   HENT:      Head: Normocephalic " and atraumatic.      Right Ear: External ear normal.      Left Ear: External ear normal.   Eyes:      Pupils: Pupils are equal, round, and reactive to light.   Neck:      Thyroid: No thyromegaly.   Cardiovascular:      Rate and Rhythm: Regular rhythm.   Pulmonary:      Breath sounds: Normal breath sounds. No wheezing or rales.   Abdominal:      General: Bowel sounds are normal.      Palpations: Abdomen is soft.      Tenderness: There is no abdominal tenderness.   Musculoskeletal:      Cervical back: Neck supple.   Lymphadenopathy:      Cervical: No cervical adenopathy.   Skin:     General: Skin is warm and dry.   Neurological:      Mental Status: He is alert and oriented to person, place, and time.   Psychiatric:         Behavior: Behavior normal.         Assessment:     1. Routine general medical examination at a health care facility      Plan:   Routine general medical examination at a health care facility  -     Comprehensive Metabolic Panel; Future; Expected date: 02/17/2022  -     CBC Auto Differential; Future; Expected date: 02/17/2022  -     TSH; Future; Expected date: 02/17/2022  -     Lipid Panel; Future; Expected date: 02/17/2022      Heart healthy diet and reg exercise   reviewed        Problem List Items Addressed This Visit    None     Visit Diagnoses     Routine general medical examination at a health care facility    -  Primary    Relevant Orders    Comprehensive Metabolic Panel    CBC Auto Differential    TSH    Lipid Panel          Follow up in about 1 year (around 2/17/2023), or if symptoms worsen or fail to improve.  Labs fasting tomorrow 845 am.

## 2022-02-21 ENCOUNTER — LAB VISIT (OUTPATIENT)
Dept: LAB | Facility: HOSPITAL | Age: 37
End: 2022-02-21
Attending: INTERNAL MEDICINE
Payer: COMMERCIAL

## 2022-02-21 DIAGNOSIS — Z00.00 ROUTINE GENERAL MEDICAL EXAMINATION AT A HEALTH CARE FACILITY: ICD-10-CM

## 2022-02-21 LAB
ALBUMIN SERPL BCP-MCNC: 4.3 G/DL (ref 3.5–5.2)
ALP SERPL-CCNC: 67 U/L (ref 55–135)
ALT SERPL W/O P-5'-P-CCNC: 27 U/L (ref 10–44)
ANION GAP SERPL CALC-SCNC: 9 MMOL/L (ref 8–16)
AST SERPL-CCNC: 15 U/L (ref 10–40)
BASOPHILS # BLD AUTO: 0.05 K/UL (ref 0–0.2)
BASOPHILS NFR BLD: 0.7 % (ref 0–1.9)
BILIRUB SERPL-MCNC: 0.9 MG/DL (ref 0.1–1)
BUN SERPL-MCNC: 11 MG/DL (ref 6–20)
CALCIUM SERPL-MCNC: 9.5 MG/DL (ref 8.7–10.5)
CHLORIDE SERPL-SCNC: 103 MMOL/L (ref 95–110)
CHOLEST SERPL-MCNC: 128 MG/DL (ref 120–199)
CHOLEST/HDLC SERPL: 3.1 {RATIO} (ref 2–5)
CO2 SERPL-SCNC: 24 MMOL/L (ref 23–29)
CREAT SERPL-MCNC: 0.9 MG/DL (ref 0.5–1.4)
DIFFERENTIAL METHOD: ABNORMAL
EOSINOPHIL # BLD AUTO: 0.2 K/UL (ref 0–0.5)
EOSINOPHIL NFR BLD: 2.9 % (ref 0–8)
ERYTHROCYTE [DISTWIDTH] IN BLOOD BY AUTOMATED COUNT: 17.4 % (ref 11.5–14.5)
EST. GFR  (AFRICAN AMERICAN): >60 ML/MIN/1.73 M^2
EST. GFR  (NON AFRICAN AMERICAN): >60 ML/MIN/1.73 M^2
GLUCOSE SERPL-MCNC: 89 MG/DL (ref 70–110)
HCT VFR BLD AUTO: 47.5 % (ref 40–54)
HDLC SERPL-MCNC: 41 MG/DL (ref 40–75)
HDLC SERPL: 32 % (ref 20–50)
HGB BLD-MCNC: 14.5 G/DL (ref 14–18)
IMM GRANULOCYTES # BLD AUTO: 0.02 K/UL (ref 0–0.04)
IMM GRANULOCYTES NFR BLD AUTO: 0.3 % (ref 0–0.5)
LDLC SERPL CALC-MCNC: 72 MG/DL (ref 63–159)
LYMPHOCYTES # BLD AUTO: 2.5 K/UL (ref 1–4.8)
LYMPHOCYTES NFR BLD: 32.5 % (ref 18–48)
MCH RBC QN AUTO: 22.4 PG (ref 27–31)
MCHC RBC AUTO-ENTMCNC: 30.5 G/DL (ref 32–36)
MCV RBC AUTO: 73 FL (ref 82–98)
MONOCYTES # BLD AUTO: 0.6 K/UL (ref 0.3–1)
MONOCYTES NFR BLD: 7.5 % (ref 4–15)
NEUTROPHILS # BLD AUTO: 4.3 K/UL (ref 1.8–7.7)
NEUTROPHILS NFR BLD: 56.1 % (ref 38–73)
NONHDLC SERPL-MCNC: 87 MG/DL
NRBC BLD-RTO: 0 /100 WBC
PLATELET # BLD AUTO: 195 K/UL (ref 150–450)
PMV BLD AUTO: 13.6 FL (ref 9.2–12.9)
POTASSIUM SERPL-SCNC: 4.3 MMOL/L (ref 3.5–5.1)
PROT SERPL-MCNC: 7.3 G/DL (ref 6–8.4)
RBC # BLD AUTO: 6.48 M/UL (ref 4.6–6.2)
SODIUM SERPL-SCNC: 136 MMOL/L (ref 136–145)
TRIGL SERPL-MCNC: 75 MG/DL (ref 30–150)
TSH SERPL DL<=0.005 MIU/L-ACNC: 1.3 UIU/ML (ref 0.4–4)
WBC # BLD AUTO: 7.59 K/UL (ref 3.9–12.7)

## 2022-02-21 PROCEDURE — 84443 ASSAY THYROID STIM HORMONE: CPT | Performed by: INTERNAL MEDICINE

## 2022-02-21 PROCEDURE — 85025 COMPLETE CBC W/AUTO DIFF WBC: CPT | Performed by: INTERNAL MEDICINE

## 2022-02-21 PROCEDURE — 36415 COLL VENOUS BLD VENIPUNCTURE: CPT | Performed by: INTERNAL MEDICINE

## 2022-02-21 PROCEDURE — 80053 COMPREHEN METABOLIC PANEL: CPT | Performed by: INTERNAL MEDICINE

## 2022-02-21 PROCEDURE — 80061 LIPID PANEL: CPT | Performed by: INTERNAL MEDICINE

## 2022-07-22 ENCOUNTER — PATIENT MESSAGE (OUTPATIENT)
Dept: INTERNAL MEDICINE | Facility: CLINIC | Age: 37
End: 2022-07-22

## 2022-07-22 ENCOUNTER — CLINICAL SUPPORT (OUTPATIENT)
Dept: INTERNAL MEDICINE | Facility: CLINIC | Age: 37
End: 2022-07-22
Payer: COMMERCIAL

## 2022-07-22 VITALS — BODY MASS INDEX: 27.27 KG/M2 | WEIGHT: 163.88 LBS

## 2022-07-22 PROCEDURE — 99999 PR PBB SHADOW E&M-EST. PATIENT-LVL I: CPT | Mod: PBBFAC,,,

## 2022-07-22 PROCEDURE — 99999 PR PBB SHADOW E&M-EST. PATIENT-LVL I: ICD-10-PCS | Mod: PBBFAC,,,

## 2023-04-03 ENCOUNTER — TELEPHONE (OUTPATIENT)
Dept: ORTHOPEDICS | Facility: CLINIC | Age: 38
End: 2023-04-03
Payer: COMMERCIAL

## 2023-04-03 ENCOUNTER — HOSPITAL ENCOUNTER (OUTPATIENT)
Dept: RADIOLOGY | Facility: HOSPITAL | Age: 38
Discharge: HOME | End: 2023-04-03
Attending: PHYSICIAN ASSISTANT
Payer: COMMERCIAL

## 2023-04-03 ENCOUNTER — OFFICE VISIT (OUTPATIENT)
Dept: ORTHOPEDICS | Facility: CLINIC | Age: 38
End: 2023-04-03
Payer: COMMERCIAL

## 2023-04-03 VITALS — WEIGHT: 163 LBS | BODY MASS INDEX: 27.16 KG/M2 | HEIGHT: 65 IN

## 2023-04-03 DIAGNOSIS — M25.572 LEFT ANKLE PAIN, UNSPECIFIED CHRONICITY: ICD-10-CM

## 2023-04-03 DIAGNOSIS — M25.572 LEFT ANKLE PAIN, UNSPECIFIED CHRONICITY: Primary | ICD-10-CM

## 2023-04-03 DIAGNOSIS — S93.409A SPRAIN OF LATERAL LIGAMENT OF ANKLE JOINT: Primary | ICD-10-CM

## 2023-04-03 DIAGNOSIS — S93.492A SPRAIN OF ANTERIOR TALOFIBULAR LIGAMENT OF LEFT ANKLE, INITIAL ENCOUNTER: ICD-10-CM

## 2023-04-03 PROCEDURE — 99999 PR PBB SHADOW E&M-EST. PATIENT-LVL III: ICD-10-PCS | Mod: PBBFAC,,, | Performed by: PHYSICIAN ASSISTANT

## 2023-04-03 PROCEDURE — 97760 PR ORTHOTIC MGMT&TRAINJ INITIAL ENC EA 15 MINS: ICD-10-PCS | Mod: S$GLB,,, | Performed by: PHYSICIAN ASSISTANT

## 2023-04-03 PROCEDURE — 73610 XR ANKLE COMPLETE 3 VIEW LEFT: ICD-10-PCS | Mod: 26,LT,, | Performed by: RADIOLOGY

## 2023-04-03 PROCEDURE — 99203 OFFICE O/P NEW LOW 30 MIN: CPT | Mod: S$GLB,,, | Performed by: PHYSICIAN ASSISTANT

## 2023-04-03 PROCEDURE — 97760 ORTHOTIC MGMT&TRAING 1ST ENC: CPT | Mod: S$GLB,,, | Performed by: PHYSICIAN ASSISTANT

## 2023-04-03 PROCEDURE — 1159F MED LIST DOCD IN RCRD: CPT | Mod: CPTII,S$GLB,, | Performed by: PHYSICIAN ASSISTANT

## 2023-04-03 PROCEDURE — 3008F BODY MASS INDEX DOCD: CPT | Mod: CPTII,S$GLB,, | Performed by: PHYSICIAN ASSISTANT

## 2023-04-03 PROCEDURE — 99203 PR OFFICE/OUTPT VISIT, NEW, LEVL III, 30-44 MIN: ICD-10-PCS | Mod: S$GLB,,, | Performed by: PHYSICIAN ASSISTANT

## 2023-04-03 PROCEDURE — 1160F PR REVIEW ALL MEDS BY PRESCRIBER/CLIN PHARMACIST DOCUMENTED: ICD-10-PCS | Mod: CPTII,S$GLB,, | Performed by: PHYSICIAN ASSISTANT

## 2023-04-03 PROCEDURE — 73610 X-RAY EXAM OF ANKLE: CPT | Mod: 26,LT,, | Performed by: RADIOLOGY

## 2023-04-03 PROCEDURE — 99999 PR PBB SHADOW E&M-EST. PATIENT-LVL III: CPT | Mod: PBBFAC,,, | Performed by: PHYSICIAN ASSISTANT

## 2023-04-03 PROCEDURE — 3008F PR BODY MASS INDEX (BMI) DOCUMENTED: ICD-10-PCS | Mod: CPTII,S$GLB,, | Performed by: PHYSICIAN ASSISTANT

## 2023-04-03 PROCEDURE — 1159F PR MEDICATION LIST DOCUMENTED IN MEDICAL RECORD: ICD-10-PCS | Mod: CPTII,S$GLB,, | Performed by: PHYSICIAN ASSISTANT

## 2023-04-03 PROCEDURE — 73610 X-RAY EXAM OF ANKLE: CPT | Mod: TC,LT

## 2023-04-03 PROCEDURE — 1160F RVW MEDS BY RX/DR IN RCRD: CPT | Mod: CPTII,S$GLB,, | Performed by: PHYSICIAN ASSISTANT

## 2023-04-03 RX ORDER — NAPROXEN 500 MG/1
500 TABLET ORAL 2 TIMES DAILY
Qty: 20 TABLET | Refills: 0 | Status: SHIPPED | OUTPATIENT
Start: 2023-04-03 | End: 2023-04-13

## 2023-04-03 NOTE — PROGRESS NOTES
Orthopaedics Sports Medicine     Left Ankle Initial Visit         4/3/2023    Referring MD: Self, Aaareferral    Chief Complaint   Patient presents with    Left Ankle - Pain         History of Present Illness:   Hira Riggs is a 37 y.o. male who presents with LEFT ankle pain and dysfunction.    Onset of the symptoms was yesterday, 04/02/2023.     Inciting event: Patient was jumping down from a tree and landed on the ankle and rolled his ankle    Current symptoms include constant lateral ankle pain, bruising,     Pain is aggravated by walking and putting any pressure on the ankle.      Evaluation to date: X-rays     Treatment to date: Massage and heat     Past Medical History:   Past Medical History:   Diagnosis Date    Frontal sinusitis 3/5/2018       Past Surgical History:   Past Surgical History:   Procedure Laterality Date    COLONOSCOPY N/A 11/20/2018    Procedure: COLONOSCOPY;  Surgeon: Vinod Epperson III, MD;  Location: [facility];  Service: Endoscopy;  Laterality: N/A;    ESOPHAGOGASTRODUODENOSCOPY N/A 11/20/2018    Procedure: ESOPHAGOGASTRODUODENOSCOPY (EGD);  Surgeon: Vinod Epperson III, MD;  Location: [facility];  Service: Endoscopy;  Laterality: N/A;       Medications:  Patient's Medications    No medications on file       Allergies: Review of patient's allergies indicates:  No Known Allergies    Social History:   Home town: [place], [place]  Occupation:   Alcohol use: He reports no history of alcohol use.  Tobacco use: He reports that he has never smoked. He has never used smokeless tobacco.    Review of systems:  History of recent illness, fevers, shakes, or chills: no  History of cardiac problems or chest pain: no  History of pulmonary problems or asthma: no  History of diabetes: no  History of prior dvt or clotting problems: no  History of sleep apnea: no      Physical Examination:  Estimated body mass index is 27.12 kg/m² as calculated from the following:    Height as of  "this encounter: 5' 5" (1.651 m).    Weight as of this encounter: 73.9 kg (163 lb).    General  Healthy appearing male in no acute distress  Alert and oriented, normal mood, appropriate affect    Ortho Exam    Left Ankle:    Inspection: skin is C/D/I, moderate swelling and bruising present along lateral ankle     Palpation tenderness: Ttp CFL and ATFL    Range of motion:  ROM equal bilaterally    Strength:        5/5 DF          5/5 PF          5/5 Inversion          5/5 Eversion    Stability:  Neg Talar Tilt     Neg Anterior Drawer     Neg Kleiger/External Rotator Stress    Neurovascular exam  - motor function grossly intact bilaterally to hip flexion, knee extension and flexion, ankle dorsiflexion and plantarflexion  - sensation intact to light touch bilaterally to femoral, tibial, tibial and peroneal distributions  - normal pedal pulses, warm and well perfused with capillary refill < 2 sec   - Calf soft and compressible      Right Ankle:    Inspection:   Normal    Palpation tenderness: none    Range of motion:  ROM equal bilaterally    Strength:        5/5 DF          5/5 PF          5/5 Inversion          5/5 Eversion    Stability:  Neg Talar Tilt     Neg Anterior Drawer     Neg Kleiger/External Rotator Stress    Neurovascular exam  - motor function grossly intact bilaterally to hip flexion, knee extension and flexion, ankle dorsiflexion and plantarflexion  - sensation intact to light touch bilaterally to femoral, tibial, tibial and peroneal distributions  - normal pedal pulses, warm and well perfused with capillary refill < 2 sec   - Calf soft and compressible      Imaging:  X-Ray Ankle Complete Left  Narrative: EXAMINATION:  XR ANKLE COMPLETE 3 VIEW LEFT    CLINICAL HISTORY:  Pain in left ankle and joints of left foot    TECHNIQUE:  AP, lateral and oblique views of the left ankle were performed.    COMPARISON:  None    FINDINGS:  Bones intact without fracture or dislocation.  Lateral soft tissue " swelling.  Impression: Lateral soft tissue swelling.  No fracture seen.    Electronically signed by: Eden Hampton MD  Date:    04/03/2023  Time:    13:42       Physician Read: I agree with the above impression.      Impression:  37 y.o. male with grade 2 ankle sprain       Plan:  Discussed diagnosis and treatment options with the patient today.  Based off physical exam today, I believe that he is a grade 2 ankle sprain involving the ATFL and CFL  I recommend we begin with a brief period of immobilization, rest, and ice to aid in swelling reduction. Short CAM walking boot and crutches provided to begin immobilization  Under the direction of Naina Floyd PA-C, 15 minutes were spent sizing, fitting, and educating for durable medical equipment application today by Naina Floyd PA-C.  CPT 11235.  I encouraged him to begin wearing the short ankle boot and ambulating with crutches until he is able to walk in the boot pain free. Encouraged early weightbearing as tolerated.  Rx for naproxen 500 BID provided, encouraged him to take this twice daily for 10 days to further help reduce inflammation of the ankle  Encouraged him to get out of the boot at the end of the day and ice the ankle and begin working on gentle range of motion exercises to avoid getting stiff.  I discussed that we will get him started in physical therapy after brief period of immobilization  Follow up with me in 2 weeks, at that time we will get him started in formal physical therapy and consider switching to a lace-up ankle brace        Naina Floyd PA-C  Sports Medicine Physician Assistant       Disclaimer: This note was prepared using a voice recognition system and is likely to have sound alike errors within the text.

## 2023-04-03 NOTE — TELEPHONE ENCOUNTER
Called patient to inform of x-rays scheduled 30 minutes prior to visit with Naina Floyd PA-C today at 2pm. Patient verbalized understanding and was grateful for the call.

## 2023-04-04 NOTE — PATIENT INSTRUCTIONS
Ankle Sprain    This information does not include all information related to this topic. For more information please visit the [employer] Academy [employer] website using the following link: Ankle Sprains    Ankle sprains are common injuries that occur among people of all ages and at all activity levels; in fact, they are the number one reason for missed participation in athletics. An ankle sprain occurs when the strong ligaments that support the ankle stretch beyond their limits and tear. The severity of a sprain can vary greatly depending on the number of ligaments involved and the extent to which the ligaments are torn.    Most sprains heal with conservative treatments like ice, elevation, over-the-counter medications, and simple rehabilitation exercises. However, if your ankle remains swollen or painful for several weeks despite conservative treatments, or if you have difficulty putting weight on your ankle, you may need to be evaluated to ensure that you do not have a severe ankle sprain or fracture.    If symptoms do not improve despite non-operative treatment, you may need surgery to repair or reconstruct the injured ligaments. Without proper treatment and rehabilitation, a chronic or untreated severe sprain can weaken your ankle, making it more likely that you will injure it again. Repeated ankle sprains can lead to long-term problems, including chronic ankle pain, arthritis, and instability.    Description  When a person sprains his or her ankle, the ankle joint turns too far in a particular direction. Inside the ankle are tough bands of tissue called ligaments, which hold the different bones together. During a sprain, one or more of those ligaments stretch too far or even tear.    Sprains can range from tiny tears in the fibers that make up the ligament to complete tears. This can cause pain and swelling, make the ankle unsteady, and make it hard to put weight on the leg with the injured ankle.  Over time, this instability can result in damage to the bones and cartilage, the smooth lining of the joint.          Cause  Patients often recall a twisting injury to their foot or ankle. If there is severe tearing of the ligaments, you might also hear or feel a pop. Sprains may occur  unexpectedly during many different activities, such as:  Walking or exercising on an uneven surface  Falling down or tripping  Participating in sports that require cutting or jumping actions, such as trail running, basketball, tennis, football, and soccer    Around 90% of ankle sprains involve an inversion injury (the foot turns inward) to the anterior talofibular (ATFL) and calcaneofibular (CFL) ligaments -- the lateral ligaments on the outside of the ankle. The less common medial ankle sprain is caused by an eversion injury (the foot turns out) to the deltoid ligament on the inside of the ankle.     Symptoms  The types and severity of symptoms for a sprained ankle vary widely depending on the degree of the injury. Symptoms may include:  Pain, both at rest and with weightbearing or activity/movement  Swelling  Bruising  Tenderness to touch  Instability of the ankle, or feeling that your ankle is giving out        Imaging Tests  An ankle sprain is largely a clinical diagnosis based on how the injury happened, symptoms, and examination by a medical professional. Occasionally, imaging studies, such as X-rays and magnetic resonance imaging (MRI) scans, are obtained to rule out a fracture or other injury to the nearby tendons and cartilage.   X-rays: X-rays provide images of dense structures, such as bone. Depending on your symptoms and examination, your doctor may order X-rays to evaluate the bones in your ankle and foot. Severe ankle sprains can have a similar degree of pain, bruising, and swelling as a fracture, making it difficult to distinguish between the two.   Stress X-rays: In addition to plain X-rays, your doctor may also order  "stress X-rays. These images are taken while the ankle is being pushed in different directions in a controlled manner. Stress X-rays help to show whether the ankle is unstable because of injured ligaments  Magnetic resonance imaging (MRI): An MRI scan is not required to diagnose ankle sprains. Your doctor may obtain an MRI:  To evaluate other structures, such as cartilage and tendons, around the ankle  If you exhibit signs of a high ankle sprain -- an injury to the ligaments and structures connecting the bones of the lower leg (tibia and fibula)  If your symptoms persist beyond 6 to 8 weeks after the injury despite conservative treatment    Classification of Ankle Sprains  After the examination, your doctor will determine the grade of your sprain to help develop a treatment plan. Sprains are graded based on how much damage has occurred to the ligaments.    Grade 1   Slight stretching and microscopic tearing of the ligament fibers  Mild tenderness, bruising, and swelling around the ankle  Typically no pain with weightbearing  No instability on examination    Grade 2   Partial tearing of the ligament  Moderate tenderness, bruising, and swelling around the ankle  Mild pain with weightbearing  Slight instability on examination    Grade 3   Complete tear of the ligament  Significant tenderness, bruising, and swelling around the ankle  Severe pain with weightbearing  Substantial instability on examination      Treatment    Home Treatments    Most people who have had an ankle sprain heal more quickly if they do certain exercises. The right exercises for you will depend on what kind of sprain you have and how severe it is. Ask your doctor which exercises you should do.    Yes, in the past treatment for a sprained ankle followed the word "RICE." The current recommendations use the acronym "PEACE AND LOVE."             You can also take medicines to relieve pain, such as acetaminophen (sample brand name: Tylenol). " Anti-inflammatories such as ibuprofen (sample brand names: Advil, Motrin), or naproxen (sample brand name: Aleve) are now not recommended due to their ability to slow down the natural healing process of the body.       Nonsurgical Treatment  Nearly all isolated low ankle sprains can be treated without surgery. Even a complete ligament tear (Grade 3) will heal without surgical repair if it is immobilized and rehabilitated appropriately.    People who have a mild sprain do not usually need to use a splint to keep their foot and ankle still. But people who have a more severe sprain sometimes do, so an ankle brace, ankle stirrup, or even a boot immobilizer may be recommended.     A three-phase program guides treatment for all ankle sprains -- from mild to severe:    Phase 1  Includes a short period of immobilization, rest, and ice to reduce the swelling.  Early weightbearing as tolerated is typically recommended during this phase.  For a Grade 2 sprain, a removable plastic device, such as a walking boot or aircast brace, can provide support.  Grade 3 sprains may require a short leg cast or cast-brace for 10 to 14 days.  In most cases, swelling and pain will last 2 to 3 days. Walking may be difficult during this time, and your doctor may recommend that you use crutches as needed.    Phase 2  Is typically initiated early and includes functional rehabilitation that focuses on:  Range of motion (ROM) exercises  Isometric strengthening  Proprioception (balance) retraining exercises  It is important to discontinue ankle immobilization during this phase to avoid stiffness.     Phase 3  Includes advancement of strengthening and proprioception exercises and the gradual return to pre-injury activities. This begins with activities that do not require turning or twisting the ankle, followed later by activities that require sharp, sudden turns (cutting activities), such as tennis, basketball, or football.  Early return to sporting  and work activities may require ankle taping or bracing.   This three-phase treatment program may take just 2 weeks to complete for minor sprains, or up to 6 to 12 weeks for more severe injuries.    Bracing  If your ankle sprain is bad enough, you may be placed in a walking boot and even on crutches until your symptoms improve and you are able to fully bear weight without pain and walk normally. As you transition back into normal acitivities or sporting activities, you doctor may recommend the use of an ankle brace to provide additional support tot he ankle. However, it is important to work on improving muscle strength, balance, proprioception, and flexibility to try and prevent further ankle sprains.     Physical therapy   Rehabilitation exercises during phase 2 and 3 of recovery are used to improve flexibility, strength, and proprioception (balance).    Early motion: To prevent stiffness, your doctor or physical therapist will provide you with exercises that involve range-of-motion or controlled movements of your ankle without resistance.  Strengthening exercises: Once the swelling and pain have improved, exercises to strengthen the dynamic stabilizers (muscles and tendons) in the front and back of your leg and ankle will be added to your treatment plan. Water exercises may be used if weight-bearing strengthening exercises, such as toe-raising, are too painful. Exercises with resistance are added as tolerated.  Proprioception (balance) training: Poor balance often leads to repeat sprains and ankle instability. A good example of a balance exercise is standing on the affected foot with the opposite foot raised and eyes closed. Balance boards are often used in this stage of rehabilitation.  Endurance and agility exercises: Once you are pain-free, other exercises, such as agility drills, may be added gradually. Running in progressively smaller [id number] is excellent for agility and calf and ankle strength. The goal  is to increase strength and range of motion as balance improves over time.    An example of a foot and ankle rehablitation program can be found at the following link: Foot and Ankle Conditioning Program    Surgical Treatment  Surgical treatment for ankle sprains is rare.   Surgery is reserved for injuries that fail to respond to nonsurgical treatment, and for patients who experience persistent ankle instability and pain  after months of rehabilitation and nonsurgical treatment.  Surgery may also be indicated for some high ankle sprains with instability of the ankle syndesmosis.  Sometimes surgery is recommended if a severe ankle sprain is associated with additional injuries, such as an ankle cartilage injury or tendon rupture.    Types of surgery  Surgical options include:  Arthroscopy: During arthroscopy, your doctor uses a small camera, called an arthroscope, to look inside your ankle joint. Miniature instruments are used to remove any loose fragments of bone or cartilage, or parts of the ligament that may be caught in the joint.  Repair/reconstruction: Your doctor may be able to repair the torn ligament with stitches or sutures. In some cases, they will reconstruct the damaged ligament by replacing it with a tissue graft obtained from other ligaments and/or tendons found in the foot and around the ankle.    Recovery  Immobilization: There is typically a period of immobilization after surgery. A cast or protective boot is applied to protect the ligaments. Be sure to follow your doctor's instructions about how long to wear the protective device; early mobility or weightbearing can re-tear the repair or the reconstructed ligament.  Rehabilitation: Rehabilitation after surgery involves restoring strength and range of motion so you can return to pre-injury function. The length of time you can expect to spend recovering depends on the extent of injury and the surgery that was performed. Rehabilitation may take weeks to  months.    Outcomes  Outcomes for ankle sprains are generally quite good. With proper treatment and rehabilitation, most patients are able to resume their day-to-day activities after a period of time. Successful outcomes and return to activity depend on:  The grade of the sprain.  Whether there are other injuries.  The patient's commitment to rehabilitation exercises. Incomplete rehabilitation is the most common cause of chronic ankle instability after a sprain. If a patient stops doing the strengthening exercises, the injured ligament(s) will weaken and put the patient at risk for future ankle sprains.    Chronic Ankle Sprains  Once you have sprained your ankle, you may continue to sprain it if the ligaments do not have time to completely heal. This can happen if you return to work, sports, or other activities before your ankle heals and is rehabilitated. It may be difficult to determine whether the ligament is completely healed, but symptoms (pain and swelling) can often guide the advancement of physical therapy and function.     If pain continues for more than 4 to 6 weeks, you may have a chronic ankle sprain. Things that tend to make an already sprained ankle worse include:  Stepping on uneven surfaces  Participating in sports that require cutting actions, or rolling and twisting of the foot    Abnormal proprioception -- a common complication of ankle sprains -- can also lead to repeat sprains.    Reinjury may result in chronic instability, pain, and damage to the underlying cartilage and bones.    Prevention  The best way to prevent ankle sprains is to maintain good muscle strength, balance, and flexibility. The following precautions will help prevent sprains:  Warm up thoroughly before exercise and physical activity.  Perform strengthening exercises to enhance your ankle stability.  Pay careful attention when walking, running, or working on an uneven surface.  Choose footwear with appropriate support for your  activity.    Links  Ankle Sprains  (https://orthoinfo.aaos.org/en/diseases--conditions/sprained-ankle/)  Foot and Ankle Conditioning Program  (https://orthoinfo.aaos.org/globalassets/pdfs/2017-rehab_foot-and-ankle.pdf)

## 2023-04-17 ENCOUNTER — OFFICE VISIT (OUTPATIENT)
Dept: ORTHOPEDICS | Facility: CLINIC | Age: 38
End: 2023-04-17
Payer: COMMERCIAL

## 2023-04-17 VITALS — WEIGHT: 163 LBS | HEIGHT: 65 IN | BODY MASS INDEX: 27.16 KG/M2

## 2023-04-17 DIAGNOSIS — S93.409A SPRAIN OF LATERAL LIGAMENT OF ANKLE JOINT: ICD-10-CM

## 2023-04-17 DIAGNOSIS — S93.492A SPRAIN OF ANTERIOR TALOFIBULAR LIGAMENT OF LEFT ANKLE, INITIAL ENCOUNTER: Primary | ICD-10-CM

## 2023-04-17 PROCEDURE — 1160F RVW MEDS BY RX/DR IN RCRD: CPT | Mod: CPTII,S$GLB,, | Performed by: PHYSICIAN ASSISTANT

## 2023-04-17 PROCEDURE — 99999 PR PBB SHADOW E&M-EST. PATIENT-LVL III: ICD-10-PCS | Mod: PBBFAC,,, | Performed by: PHYSICIAN ASSISTANT

## 2023-04-17 PROCEDURE — 97760 PR ORTHOTIC MGMT&TRAINJ INITIAL ENC EA 15 MINS: ICD-10-PCS | Mod: S$GLB,,, | Performed by: PHYSICIAN ASSISTANT

## 2023-04-17 PROCEDURE — 3008F PR BODY MASS INDEX (BMI) DOCUMENTED: ICD-10-PCS | Mod: CPTII,S$GLB,, | Performed by: PHYSICIAN ASSISTANT

## 2023-04-17 PROCEDURE — 1160F PR REVIEW ALL MEDS BY PRESCRIBER/CLIN PHARMACIST DOCUMENTED: ICD-10-PCS | Mod: CPTII,S$GLB,, | Performed by: PHYSICIAN ASSISTANT

## 2023-04-17 PROCEDURE — 1159F PR MEDICATION LIST DOCUMENTED IN MEDICAL RECORD: ICD-10-PCS | Mod: CPTII,S$GLB,, | Performed by: PHYSICIAN ASSISTANT

## 2023-04-17 PROCEDURE — 1159F MED LIST DOCD IN RCRD: CPT | Mod: CPTII,S$GLB,, | Performed by: PHYSICIAN ASSISTANT

## 2023-04-17 PROCEDURE — 99213 PR OFFICE/OUTPT VISIT, EST, LEVL III, 20-29 MIN: ICD-10-PCS | Mod: S$GLB,,, | Performed by: PHYSICIAN ASSISTANT

## 2023-04-17 PROCEDURE — 99213 OFFICE O/P EST LOW 20 MIN: CPT | Mod: S$GLB,,, | Performed by: PHYSICIAN ASSISTANT

## 2023-04-17 PROCEDURE — 97760 ORTHOTIC MGMT&TRAING 1ST ENC: CPT | Mod: S$GLB,,, | Performed by: PHYSICIAN ASSISTANT

## 2023-04-17 PROCEDURE — 3008F BODY MASS INDEX DOCD: CPT | Mod: CPTII,S$GLB,, | Performed by: PHYSICIAN ASSISTANT

## 2023-04-17 PROCEDURE — 99999 PR PBB SHADOW E&M-EST. PATIENT-LVL III: CPT | Mod: PBBFAC,,, | Performed by: PHYSICIAN ASSISTANT

## 2023-04-17 NOTE — PROGRESS NOTES
Orthopaedic Follow-Up Visit    Last Appointment: 04/03/2023  Diagnosis: Sprain of lateral ligament of ankle joint                     Sprain of anterior talofibular ligament of left ankle, initial encounter  Prior Procedure: None    Hira Riggs is a 37 y.o. male who is here for f/u evaluation of the LEFT ankle. The patient was last seen here by me on 04/03/2023 at which point we decided to begin with a brief period of immobilization with a short CAM walking boot and crutches and an RX for naproxen 500 BID. The patient returns today reporting that the symptoms have improved but still feels pain when pressure is applied laterally, and is interested in proceeding with further options.     To review his history, Hira Riggs is a 37 y.o. male who presents with LEFT ankle pain and dysfunction. Onset of the symptoms was 04/02/2023. Patient was jumping down from a tree and landed on the ankle and rolled his ankle. Current symptoms include constant lateral ankle pain, bruising. Pain is aggravated by walking and putting any pressure on the ankle. Evaluation to date: X-rays. Treatment to date includes rest, activity modification, brief period of immobilization with short CAM boot, anti-inflammatories.     Patient's medications, allergies, past medical, surgical, social and family histories were reviewed and updated as appropriate.    Review of Systems   All systems reviewed were negative.  Specifically, the patient denies fever, chills, weight loss, chest pain, shortness of breath, or dyspnea on exertion.      Past Medical History:   Diagnosis Date    Frontal sinusitis 3/5/2018       Objective:      Physical Exam  Patient is alert and oriented, no distress. Skin is intact. Neuro is normal with no focal motor or sensory findings.    Left Ankle:     Inspection: skin is C/D/I, mild swelling along lateral ankle; bruising resolved     Palpation tenderness: Ttp CFL and ATFL, ttp lateral ankle musculature      Range  of motion:        ROM equal bilaterally     Strength:                     5/5 DF                                      5/5 PF                                      5/5 Inversion                                      5/5 Eversion     Stability:                      Neg Talar Tilt                                      Neg Anterior Drawer                                      Neg Kleiger/External Rotator Stress     Neurovascular exam  - motor function grossly intact bilaterally to hip flexion, knee extension and flexion, ankle dorsiflexion and plantarflexion  - sensation intact to light touch bilaterally to femoral, tibial, tibial and peroneal distributions  - normal pedal pulses, warm and well perfused with capillary refill < 2 sec   - Calf soft and compressible    Imaging:   XR Results:  EXAMINATION:  XR ANKLE COMPLETE 3 VIEW LEFT     CLINICAL HISTORY:  Pain in left ankle and joints of left foot     TECHNIQUE:  AP, lateral and oblique views of the left ankle were performed.     COMPARISON:  None     FINDINGS:  Bones intact without fracture or dislocation.  Lateral soft tissue swelling.     Impression:     Lateral soft tissue swelling.  No fracture seen.        Electronically signed by: Eden Hampton MD  Date:                                            04/03/2023  Time:                                           13:42        Physician Read: I agree with the above impression.    Assessment/Plan:   Assessment:  Hira Riggs is a 37 y.o. male with left lateral ankle sprain-improving    Plan:    Discussed diagnosis and treatment options with the patient today.  He is a little over 2 weeks out of a left lateral ankle sprain.  At last visit I kept him in a Cam walking boot and crutches until he was able to bear weight as tolerated in the boot.  Today he presents ambulating without the crutches with the boot.  I am happy with his progress thus far.  I would like him to begin physical therapy to begin working on range of  motion and swelling reduction, followed by isometric strengthening than dynamic strengthening  Internal Physical therapy referral placed to [facility]  I also provided him with a lace-up ankle brace at visit today.  I encouraged him to wear this brace during times of increased activity, patient voiced understanding  Under the direction of Naina Floyd PA-C, 15 minutes were spent sizing, fitting, and educating for durable medical equipment application today by Naina Floyd PA-C.  CPT 58292.  Follow-up with me if no improvement of symptoms with PT        Naina Floyd PA-C  Sports Medicine Physician Assistant       Disclaimer: This note was prepared using a voice recognition system and is likely to have sound alike errors within the text.

## 2023-04-24 ENCOUNTER — CLINICAL SUPPORT (OUTPATIENT)
Dept: REHABILITATION | Facility: HOSPITAL | Age: 38
End: 2023-04-24
Payer: COMMERCIAL

## 2023-04-24 DIAGNOSIS — Z74.09 DECREASED FUNCTIONAL MOBILITY AND ENDURANCE: ICD-10-CM

## 2023-04-24 DIAGNOSIS — M25.60 DECREASED RANGE OF MOTION: ICD-10-CM

## 2023-04-24 DIAGNOSIS — R53.1 DECREASED STRENGTH: ICD-10-CM

## 2023-04-24 DIAGNOSIS — S93.492A SPRAIN OF ANTERIOR TALOFIBULAR LIGAMENT OF LEFT ANKLE, INITIAL ENCOUNTER: ICD-10-CM

## 2023-04-24 DIAGNOSIS — S93.409A SPRAIN OF LATERAL LIGAMENT OF ANKLE JOINT: ICD-10-CM

## 2023-04-24 PROCEDURE — 97161 PT EVAL LOW COMPLEX 20 MIN: CPT

## 2023-04-24 NOTE — PLAN OF CARE
[facility]   Physical Therapy Initial Evaluation   Date: 4/24/2023   Name: Hira Riggs  Clinic Number: [id number]    Therapy Diagnosis:   Encounter Diagnoses   Name Primary?    Sprain of anterior talofibular ligament of left ankle, initial encounter     Sprain of lateral ligament of ankle joint     Decreased range of motion     Decreased strength     Decreased functional mobility and endurance       Physician: Naina Floyd PA-C     Physician Orders: Physical Therapy Evaluation and Treat  Medical Diagnosis from Referral:   S93.492A (ICD-10-CM) - Sprain of anterior talofibular ligament of left ankle, initial encounter   S93.409A (ICD-10-CM) - Sprain of lateral ligament of ankle joint   Evaluation Date: 4/24/2023  Authorization Period Expiration: 04/16/2024  Plan of Care Expiration: 07/17/2023  Progress Note Due: 04/24/2023  Visit # / Visits authorized: 1/1   FOTO: 1/3     Precautions: Standard    Time In: 3:35 PM  Time Out: 4:15 PM  Total Billable Time (timed & untimed codes): 40 minutes    SUBJECTIVE   Date of onset: 3 weeks ago    History of current condition - Hira reports: about 3 weeks ago he was playing outside with his son and went up on a tree branch and jumped down and rolled his left ankle. Patient states he has been to a doctor for this and they placed him in a boot for 4 days and then an ankle brace, but states he is no longer wearing a brace. Patient does state he is still experiencing ankle pain and stiffness and is not fully able to get into all squatting positions he can normally get in.    Falls: not applicable     Exercise Regimen: not applicable     Imaging: [x] Xray [] MRI [] CT: Performed on: 04/03/2023    Pain:  Current 6/10, worst 8/10, best 2/10   Location: [] Right   [x] Left:  ankle  Description: Dull  Aggravating Factors: squatting, ankle DORSIFLEXION   Easing Factors: activity avoidance, rest, massaging     Prior Therapy:   [] N/A    [x] Yes: low  back  Social History: Patient lives with their family  Occupation: Patient is IT worker   Prior Level of Function: Independent and pain free with all ADL, IADL, community mobility and functional activities.   Current Level of Function: Independent with all ADL, IADL, community mobility and functional activities with reports of increased pain and need for increased time and frequent breaks.      Dominant Extremity:    [x] Right    [] Left      Medical History:   Past Medical History:   Diagnosis Date    Frontal sinusitis 3/5/2018       Surgical History:   Hira Riggs  has a past surgical history that includes Esophagogastroduodenoscopy (N/A, 11/20/2018) and Colonoscopy (N/A, 11/20/2018).    Medications:   Hira currently has no medications in their medication list.    Allergies:   Review of patient's allergies indicates:  No Known Allergies       OBJECTIVE     Sensation:  [x] Intact to Light Touch   [] Impaired:    Palpation: Increased tone and tenderness noted with palpation of left ATFL and lateral left ankle ligaments as well as anterior left ankle.    Posture: Patient presents with postural abnormalities which include:    [x] Forward Head   [] Increased Lumbar Lordosis   [x] Rounded Shoulder   [] Genu Recurvatum   [] Increased Thoracic Kyphosis [] Genu Valgus   [] Trunk Deviated    [] Pes Planus   [] Scapular Winging    [] Other:     Gait Analysis: The patient ambulated with the following assistive device: none; the patient presents with the following gait abnormalities: decreased left ankle heel strike      Functional Movement  Analysis   Deep Squat Painful  and Dysfunctional   Squat on Knees Painful  and Dysfunctional       RANGE OF MOTION:    Ankle/Foot AROM/PROM Right Left Pain/Dysfunction with Movement Goal   Dorsiflexion (20) WITHIN FUNCTIONAL LIMITS  WITHIN FUNCTIONAL LIMITS   -   Plantarflexion (50) WITHIN FUNCTIONAL LIMITS  WITHIN FUNCTIONAL LIMITS   -   Inversion (35) WITHIN FUNCTIONAL LIMITS   WITHIN FUNCTIONAL LIMITS   -   Eversion (15) WITHIN FUNCTIONAL LIMITS  WITHIN FUNCTIONAL LIMITS   -   Ankle DORSIFLEXION lunge test 2 inches to great toe from wall 1 inch from great toe to wall Pain in anterior ankle 2 inches to great toe from wall       STRENGTH:    L/E MMT Right  (spine) Left Pain/Dysfunction with Movement Goal   Hip Flexion  5/5 5/5  4+/5 Bilateral   Hip Extension  5/5 5/5  4+/5 Bilateral   Hip Abduction  5/5 5/5  4+/5 Bilateral   Knee Extension 5/5 5/5  5/5 Bilateral   Knee Flexion 5/5 5/5  5/5 Bilateral   Ankle Dorsiflexion 5/5 4+/5  5/5 Bilateral   Ankle Plantarflexion 5/5 4+/5  5/5 Bilateral   Ankle Inversion 5/5 3+/5  5/5 Bilateral   Ankle Eversion 5/5 4-/5  5/5 Bilateral       MUSCLE LENGTH:     Muscle Tested  Right Left  Goal   Gastrocnemius  decreased decreased Normal Bilateral   Soleus  decreased decreased Normal Bilateral       JOINT MOBILITY:     Joint Motion Tested Right   Left  Goal   Talocrural  Normal Hypomobile Normal Bilateral     FUNCTION:     CMS Impairment/Limitation/Restriction for FOTO Ankle Sprain/Strain Survey    Therapist reviewed FOTO scores for [employer] on 4/24/2023.   FOTO documents entered into COM DEV - see Media section.    Limitation Score: 48%         TREATMENT       Hira received the treatments listed below:      THERAPEUTIC EXERCISES to develop strength, endurance, ROM, flexibility, posture, and core stabilization for (0) minutes including:    Intervention Performed Today                                              Plan for Next Visit: Recumbent Bike, standing gastroc/soleus stretch, ankle 4 way therabands, BAPS board, arch doming, standing calf raises, single leg balance       PATIENT EDUCATION AND HOME EXERCISES     Education provided: (15) minutes  PURPOSE: Patient educated on the impairments noted above and the effects of physical therapy intervention to improve overall condition and QOL.   EXERCISE: Patient was educated on all the above exercise  prior/during/after for proper posture, positioning, and execution for safe performance with home exercise program.   STRENGTH: Patient educated on the importance of improved core and extremity strength in order to improve alignment of the spine and extremities with static positions and dynamic movement.     Written Home Exercises Provided: no.  Exercises were reviewed and Hira was able to demonstrate them prior to the end of the session.  Hira demonstrated good  understanding of the education provided. See EMR under Patient Instructions for exercises provided during therapy sessions.    ASSESSMENT     Hira is a 37 y.o. male referred to outpatient Physical Therapy with a medical diagnosis of Sprain of anterior talofibular ligament of left ankle, initial encounter and Sprain of lateral ligament of ankle joint. Patient presents with impairments including: impairments list: ROM, strength, endurance, joint mobility, muscle length, gait mechanics, and functional movement patterns.    Patient prognosis is Good.   Patient will benefit from skilled outpatient Physical Therapy to address the deficits stated above and in the chart below, provide patient/family education, and to maximize patient's level of independence.     Plan of care discussed with patient: Yes  Patient's spiritual, cultural and educational needs considered and patient is agreeable to the plan of care and goals as stated below:     Anticipated Barriers for therapy: sedentary lifestyle    Medical Necessity is demonstrated by the following  History  Co-morbidities and personal factors that may impact the plan of care Co-morbidities:   high BMI    Personal Factors:   lifestyle     moderate   Examination  Body Structures and Functions, activity limitations and participation restrictions that may impact the plan of care Body Regions:   lower extremities    Body Systems:    gross symmetry  ROM  strength  gross coordinated  "movement  balance  gait  transfers    Participation Restrictions:   See above in "Current Level of Function"     Activity limitations:   Learning and applying knowledge  no deficits    General Tasks and Commands  no deficits    Communication  no deficits    Mobility  lifting and carrying objects  walking    Self care  no deficits    Domestic Life  assisting others    Interactions/Relationships  no deficits    Life Areas  no deficits    Community and Social Life  community life  recreation and leisure         moderate   Clinical Presentation stable and uncomplicated low   Decision Making/ Complexity Score: low     GOALS:    Short Term Goals:  6 weeks Status  Date Met   PAIN: Patient will report worst pain of 4/10 in order to progress toward max functional ability and improve quality of life. [x] Progressing  [] Met  [] Not Met    FUNCTION: Patient will demonstrate improved function as indicated by a functional limitation score of less than or equal to 35 out of 100 on FOTO. [x] Progressing  [] Met  [] Not Met    MOBILITY: Patient will improve Range of Motion to 50% of stated goals, listed in objective measures above, in order to progress towards independence with functional activities.  [x] Progressing  [] Met  [] Not Met    STRENGTH: Patient will improve strength to 50% of stated goals, listed in objective measures above, in order to progress towards independence with functional activities.  [x] Progressing  [] Met  [] Not Met      Long Term Goals:  12 weeks Status Date Met   PAIN: Patient will report worst pain of 2/10 in order to progress toward max functional ability and improve quality of life [x] Progressing  [] Met  [] Not Met    FUNCTION: Patient will demonstrate improved function as indicated by a functional limitation score of less than or equal to 27 out of 100 on FOTO. [x] Progressing  [] Met  [] Not Met    MOBILITY: Patient will improve Range of Motion to stated goals, listed in objective measures above, " in order to return to maximal functional potential and improve quality of life. [x] Progressing  [] Met  [] Not Met    STRENGTH: Patient will improve strength to stated goals, listed in objective measures above, in order to improve functional independence and quality of life. [x] Progressing  [] Met  [] Not Met    Patient will return to normal ADL's, IADL's, community involvement, recreational activities, and work-related activities with less than or equal to 2/10 pain and maximal function.  [x] Progressing  [] Met  [] Not Met      PLAN   Plan of Care Certification: 4/24/2023 to 07/17/2023.    Outpatient Physical Therapy 2 times weekly for 12 weeks to include any combination of the following interventions: virtual visits, dry needling, modalities, electrical stimulation (IFC, Pre-Mod, Attended with Functional Dry Needling), Cervical/Lumbar Traction, Gait Training, Manual Therapy, Moist Heat/ Ice, Neuromuscular Re-ed, Patient Education, Self Care, Therapeutic Exercise, and Therapeutic Activites     Rea Manuel, PT, DPT      I CERTIFY THE NEED FOR THESE SERVICES FURNISHED UNDER THIS PLAN OF TREATMENT AND WHILE UNDER MY CARE   Physician's comments:     Physician's Signature: ___________________________________________________

## 2023-04-27 ENCOUNTER — CLINICAL SUPPORT (OUTPATIENT)
Dept: REHABILITATION | Facility: HOSPITAL | Age: 38
End: 2023-04-27
Payer: COMMERCIAL

## 2023-04-27 DIAGNOSIS — M25.60 DECREASED RANGE OF MOTION: Primary | ICD-10-CM

## 2023-04-27 DIAGNOSIS — Z74.09 DECREASED FUNCTIONAL MOBILITY AND ENDURANCE: ICD-10-CM

## 2023-04-27 DIAGNOSIS — R53.1 DECREASED STRENGTH: ICD-10-CM

## 2023-04-27 PROCEDURE — 97112 NEUROMUSCULAR REEDUCATION: CPT | Mod: CQ

## 2023-04-27 PROCEDURE — 97110 THERAPEUTIC EXERCISES: CPT | Mod: CQ

## 2023-04-27 NOTE — PROGRESS NOTES
[facility]   Physical Therapy Treatment Note     Name: Hira Riggs  Clinic Number: [id number]    Therapy Diagnosis:   Encounter Diagnoses   Name Primary?    Decreased range of motion Yes    Decreased strength     Decreased functional mobility and endurance      Physician: Naina Floyd PA-C    Visit Date: 4/27/2023    Physician Orders: Physical Therapy Evaluation and Treat  Medical Diagnosis from Referral:   S93.492A (ICD-10-CM) - Sprain of anterior talofibular ligament of left ankle, initial encounter   S93.409A (ICD-10-CM) - Sprain of lateral ligament of ankle joint   Evaluation Date: 4/24/2023  Authorization Period Expiration: 04/16/2024  Plan of Care Expiration: 07/17/2023  Progress Note Due: 05/24/2023  Visit # / Visits authorized: 1/20 (+ evaluation)  FOTO: 1/3      Precautions: Standard    PTA Visit #: 1/5     Time In: 1405  Time Out: 1500  Total Billable Time: 55 minutes (Billing reflects 1 on 1 treatment time spent with patient)    SUBJECTIVE     Patient reports: pain after activity but usually not during activity. More pain in the morning but as he stretches out during the day, his pain is less    He N/A compliant with home exercise program.    Response to previous treatment: no change after evaluation    Functional change: not running, difficulty with full kneeling and squatting, unable to sit on his heels.      Pain: 0/10     Location: left foot    OBJECTIVE     Objective Measures updated at progress report or POC update only unless otherwise noted.       TREATMENT     Hira received the treatments listed below:     MANUAL THERAPY TECHNIQUES were applied for (0) minutes, including:    Manual Intervention Performed Today    Soft Tissue Mobilization []      Joint Mobilizations []     []     []    Functional Dry Needling  []      Plan for Next Visit: Continue as needed       THERAPEUTIC EXERCISES to develop strength, endurance, ROM, flexibility, posture, and core  stabilization for (30) minutes including:    Intervention Performed Today    Ankle active range of motion  X  X  X  x 3 x 10 dorsiflexion bilateral   3 x 10 plantarflexion bilateral   3 x 10 inversion bilateral   3 x 10 eversion bilateral    Toe flexion with towel x X 30                                    Plan for Next Visit:        NEUROMUSCULAR RE-EDUCATION ACTIVITIES to improve Balance, Coordination, Kinesthetic, Sense, Proprioception, and Posture for (25) minutes.  The following were included:    Intervention Performed Today    Arch press up x 3 x 10 sitting   Toe yoga x 3 x 10 each great toe extension and 2-5 toes extension independent of one another   Gastrocnemius stretch x 3 x 30 seconds left foot only                              Plan for Next Visit:          PATIENT EDUCATION AND HOME EXERCISES     Home Exercises Provided and Patient Education Provided     Education provided: (during session) minutes  PURPOSE: Patient educated on the impairments noted above and the effects of physical therapy intervention to improve overall condition and QOL.   EXERCISE: Patient was educated on all the above exercise prior/during/after for proper posture, positioning, and execution for safe performance with home exercise program.   STRENGTH: Patient educated on the importance of improved core and extremity strength in order to improve alignment of the spine and extremities with static positions and dynamic movement.   4/27/2023: added home exercise program to my chart and gave patient paper copy.      Written Home Exercises Provided: yes.  Exercises were reviewed and Hira was able to demonstrate them prior to the end of the session.  Hira demonstrated good  understanding of the education provided. See EMR under Patient Instructions for exercises provided during therapy sessions.    ASSESSMENT     Patient tolerated this session with difficulty with toe yoga and with minimal range of motion with arch press ups. With  persistence, his quality with these two exercises was improved. He expressed more pain with plantarflexion with resistance in comparison to any other ankle movement. He had minimal edema noted in the dorsum of his left foot. He may be able to be progressed quickly to standing exercises with unstable surfaces in the near future.     Hira is progressing well towards his goals.   Pt prognosis is Good.     Pt will continue to benefit from skilled outpatient physical therapy to address the deficits listed in the problem list box on initial evaluation, provide pt/family education and to maximize pt's level of independence in the home and community environment.     Pt's spiritual, cultural and educational needs considered and pt agreeable to plan of care and goals.     Anticipated Barriers for therapy: sedentary lifestyle    GOALS:     Short Term Goals:  6 weeks Status  Date Met   PAIN: Patient will report worst pain of 4/10 in order to progress toward max functional ability and improve quality of life. [x] Progressing  [] Met  [] Not Met     FUNCTION: Patient will demonstrate improved function as indicated by a functional limitation score of less than or equal to 35 out of 100 on FOTO. [x] Progressing  [] Met  [] Not Met     MOBILITY: Patient will improve Range of Motion to 50% of stated goals, listed in objective measures above, in order to progress towards independence with functional activities.  [x] Progressing  [] Met  [] Not Met     STRENGTH: Patient will improve strength to 50% of stated goals, listed in objective measures above, in order to progress towards independence with functional activities.  [x] Progressing  [] Met  [] Not Met        Long Term Goals:  12 weeks Status Date Met   PAIN: Patient will report worst pain of 2/10 in order to progress toward max functional ability and improve quality of life [x] Progressing  [] Met  [] Not Met     FUNCTION: Patient will demonstrate improved function as indicated  by a functional limitation score of less than or equal to 27 out of 100 on FOTO. [x] Progressing  [] Met  [] Not Met     MOBILITY: Patient will improve Range of Motion to stated goals, listed in objective measures above, in order to return to maximal functional potential and improve quality of life. [x] Progressing  [] Met  [] Not Met     STRENGTH: Patient will improve strength to stated goals, listed in objective measures above, in order to improve functional independence and quality of life. [x] Progressing  [] Met  [] Not Met     Patient will return to normal ADL's, IADL's, community involvement, recreational activities, and work-related activities with less than or equal to 2/10 pain and maximal function.  [x] Progressing  [] Met  [] Not Met           PLAN     Continue Plan of Care (POC) and progress per patient tolerance. See treatment section for details on planned progressions next session.      Bernard Lozano, PTA

## 2023-05-03 ENCOUNTER — CLINICAL SUPPORT (OUTPATIENT)
Dept: REHABILITATION | Facility: HOSPITAL | Age: 38
End: 2023-05-03
Payer: COMMERCIAL

## 2023-05-03 DIAGNOSIS — R53.1 DECREASED STRENGTH: ICD-10-CM

## 2023-05-03 DIAGNOSIS — M25.60 DECREASED RANGE OF MOTION: Primary | ICD-10-CM

## 2023-05-03 DIAGNOSIS — Z74.09 DECREASED FUNCTIONAL MOBILITY AND ENDURANCE: ICD-10-CM

## 2023-05-03 PROCEDURE — 97110 THERAPEUTIC EXERCISES: CPT

## 2023-05-03 PROCEDURE — 97530 THERAPEUTIC ACTIVITIES: CPT

## 2023-05-03 PROCEDURE — 97112 NEUROMUSCULAR REEDUCATION: CPT

## 2023-05-03 NOTE — PROGRESS NOTES
[facility]   Physical Therapy Treatment Note     Name: Hira Riggs  Clinic Number: [id number]    Therapy Diagnosis:   Encounter Diagnoses   Name Primary?    Decreased range of motion Yes    Decreased strength     Decreased functional mobility and endurance      Physician: Naina Floyd PA-C    Visit Date: 5/3/2023    Physician Orders: Physical Therapy Evaluation and Treat  Medical Diagnosis from Referral:   S93.492A (ICD-10-CM) - Sprain of anterior talofibular ligament of left ankle, initial encounter   S93.409A (ICD-10-CM) - Sprain of lateral ligament of ankle joint   Evaluation Date: 4/24/2023  Authorization Period Expiration: 04/16/2024  Plan of Care Expiration: 07/17/2023  Progress Note Due: 05/24/2023  Visit # / Visits authorized: 2/20 (+ evaluation)  FOTO: 1/3      Precautions: Standard    PTA Visit #: 0/5     Time In: 10:50 AM  Time Out: 11:30 AM  Total Billable Time: 45 minutes (Billing reflects 1 on 1 treatment time spent with patient)    SUBJECTIVE     Patient reports: he felt good after last visit and only has minimal pain.    He N/A compliant with home exercise program.    Response to previous treatment: good, no soreness    Functional change: not running, difficulty with full kneeling and squatting, unable to sit on his heels.      Pain: 0/10     Location: left foot    OBJECTIVE     Objective Measures updated at progress report or POC update only unless otherwise noted.       TREATMENT     Hira received the treatments listed below:     MANUAL THERAPY TECHNIQUES were applied for (0) minutes, including:    Manual Intervention Performed Today    Soft Tissue Mobilization []      Joint Mobilizations []     []     []    Functional Dry Needling  []      Plan for Next Visit: Continue as needed       THERAPEUTIC EXERCISES to develop strength, endurance, ROM, flexibility, posture, and core stabilization for (18) minutes including:    Intervention Performed Today     Upright Bike x 5 mintues   Ankle active range of motion   Yellow band added with all exercises today X  X  X  x 3 x 10 dorsiflexion bilateral   3 x 10 plantarflexion bilateral   3 x 10 inversion bilateral   3 x 10 eversion bilateral    Toe flexion with towel x X 30    DORSIFLEXION lunge stretch x 10x with 5 second holds                         Plan for Next Visit:        NEUROMUSCULAR RE-EDUCATION ACTIVITIES to improve Balance, Coordination, Kinesthetic, Sense, Proprioception, and Posture for (8) minutes.  The following were included:    Intervention Performed Today    Arch press up x 3 x 10 sitting   Toe yoga x 3 x 10 each great toe extension and 2-5 toes extension independent of one another   Gastrocnemius stretch  3 x 30 seconds left foot only                    Plan for Next Visit:      THERAPEUTIC ACTIVITIES: to improve functional performance through dynamic activities, for (14)  minutes including:   x = performed today    Therapeutic Activities 5/3/2023    Single Leg Stance  Floor  Airex   X  x 3x30 second holds   BAPS Board x X20 clockwise and counter clockwise Level 1   Single Leg Squat Shuttle x 4 bands, x30 left LOWER EXTREMITY only         BOLD = new this visit  Plan for Next Visit: progress as tolerated       PATIENT EDUCATION AND HOME EXERCISES     Home Exercises Provided and Patient Education Provided     Education provided: (during session) minutes  PURPOSE: Patient educated on the impairments noted above and the effects of physical therapy intervention to improve overall condition and QOL.   EXERCISE: Patient was educated on all the above exercise prior/during/after for proper posture, positioning, and execution for safe performance with home exercise program.   STRENGTH: Patient educated on the importance of improved core and extremity strength in order to improve alignment of the spine and extremities with static positions and dynamic movement.   4/27/2023: added home exercise program to my chart  and gave patient paper copy.      Written Home Exercises Provided: yes.  Exercises were reviewed and Hira was able to demonstrate them prior to the end of the session.  Hira demonstrated good  understanding of the education provided. See EMR under Patient Instructions for exercises provided during therapy sessions.    ASSESSMENT     Patient tolerated session well and added single leg balance activities as well as ankle DORSIFLEXION mobility with single leg shuttle squat and lunge stretches. No adverse effects noted; plan to continue progressing as tolerated.     Hira is progressing well towards his goals.   Patient prognosis is Good.     Patient will continue to benefit from skilled outpatient physical therapy to address the deficits listed in the problem list box on initial evaluation, provide pt/family education and to maximize pt's level of independence in the home and community environment.     Patient's spiritual, cultural and educational needs considered and pt agreeable to plan of care and goals.     Anticipated Barriers for therapy: sedentary lifestyle    GOALS:  Reviewed: 05/03/2023     Short Term Goals:  6 weeks Status  Date Met   PAIN: Patient will report worst pain of 4/10 in order to progress toward max functional ability and improve quality of life. [x] Progressing  [] Met  [] Not Met     FUNCTION: Patient will demonstrate improved function as indicated by a functional limitation score of less than or equal to 35 out of 100 on FOTO. [x] Progressing  [] Met  [] Not Met     MOBILITY: Patient will improve Range of Motion to 50% of stated goals, listed in objective measures above, in order to progress towards independence with functional activities.  [x] Progressing  [] Met  [] Not Met     STRENGTH: Patient will improve strength to 50% of stated goals, listed in objective measures above, in order to progress towards independence with functional activities.  [x] Progressing  [] Met  [] Not Met         Long Term Goals:  12 weeks Status Date Met   PAIN: Patient will report worst pain of 2/10 in order to progress toward max functional ability and improve quality of life [x] Progressing  [] Met  [] Not Met     FUNCTION: Patient will demonstrate improved function as indicated by a functional limitation score of less than or equal to 27 out of 100 on FOTO. [x] Progressing  [] Met  [] Not Met     MOBILITY: Patient will improve Range of Motion to stated goals, listed in objective measures above, in order to return to maximal functional potential and improve quality of life. [x] Progressing  [] Met  [] Not Met     STRENGTH: Patient will improve strength to stated goals, listed in objective measures above, in order to improve functional independence and quality of life. [x] Progressing  [] Met  [] Not Met     Patient will return to normal ADL's, IADL's, community involvement, recreational activities, and work-related activities with less than or equal to 2/10 pain and maximal function.  [x] Progressing  [] Met  [] Not Met           PLAN     Continue Plan of Care (POC) and progress per patient tolerance. See treatment section for details on planned progressions next session.      Rea Manuel, PT

## 2023-05-05 ENCOUNTER — CLINICAL SUPPORT (OUTPATIENT)
Dept: REHABILITATION | Facility: HOSPITAL | Age: 38
End: 2023-05-05
Payer: COMMERCIAL

## 2023-05-05 DIAGNOSIS — M25.60 DECREASED RANGE OF MOTION: Primary | ICD-10-CM

## 2023-05-05 DIAGNOSIS — R53.1 DECREASED STRENGTH: ICD-10-CM

## 2023-05-05 DIAGNOSIS — Z74.09 DECREASED FUNCTIONAL MOBILITY AND ENDURANCE: ICD-10-CM

## 2023-05-05 PROCEDURE — 97110 THERAPEUTIC EXERCISES: CPT

## 2023-05-05 PROCEDURE — 97112 NEUROMUSCULAR REEDUCATION: CPT

## 2023-05-05 PROCEDURE — 97530 THERAPEUTIC ACTIVITIES: CPT

## 2023-05-05 NOTE — PROGRESS NOTES
[facility]   Physical Therapy Treatment Note     Name: Hira Riggs  Clinic Number: [id number]    Therapy Diagnosis:   Encounter Diagnoses   Name Primary?    Decreased range of motion Yes    Decreased strength     Decreased functional mobility and endurance      Physician: Naina Floyd PA-C    Visit Date: 5/5/2023    Physician Orders: Physical Therapy Evaluation and Treat  Medical Diagnosis from Referral:   S93.492A (ICD-10-CM) - Sprain of anterior talofibular ligament of left ankle, initial encounter   S93.409A (ICD-10-CM) - Sprain of lateral ligament of ankle joint   Evaluation Date: 4/24/2023  Authorization Period Expiration: 04/16/2024  Plan of Care Expiration: 07/17/2023  Progress Note Due: 05/24/2023  Visit # / Visits authorized: 3/20 (+ evaluation)  FOTO: 1/3      Precautions: Standard    PTA Visit #: 0/5     Time In: 12:30 PM  Time Out: 1:15 PM  Total Billable Time: 45 minutes (Billing reflects 1 on 1 treatment time spent with patient)    SUBJECTIVE     Patient reports: he is feeling good today and reports no new complaints.    He N/A compliant with home exercise program.    Response to previous treatment: good, no soreness    Functional change: not running, difficulty with full kneeling and squatting, unable to sit on his heels.      Pain: 0/10     Location: left foot    OBJECTIVE     Objective Measures updated at progress report or POC update only unless otherwise noted.       TREATMENT     Hira received the treatments listed below:     MANUAL THERAPY TECHNIQUES were applied for (0) minutes, including:    Manual Intervention Performed Today    Soft Tissue Mobilization []      Joint Mobilizations []     []     []    Functional Dry Needling  []      Plan for Next Visit: Continue as needed       THERAPEUTIC EXERCISES to develop strength, endurance, ROM, flexibility, posture, and core stabilization for (15) minutes including:    Intervention Performed Today     Upright Bike x 5 mintues   Ankle active range of motion   Red band added with all exercises today X  X  X  x 3 x 10 dorsiflexion bilateral   3 x 10 plantarflexion bilateral   3 x 10 inversion bilateral   3 x 10 eversion bilateral    Toe flexion with towel  X 30    DORSIFLEXION lunge stretch x 10x with 5 second holds bilateral                         Plan for Next Visit:        NEUROMUSCULAR RE-EDUCATION ACTIVITIES to improve Balance, Coordination, Kinesthetic, Sense, Proprioception, and Posture for (11) minutes.  The following were included:    Intervention Performed Today    Arch press up  3 x 10 sitting   Toe yoga  3 x 10 each great toe extension and 2-5 toes extension independent of one another   Gastrocnemius stretch x 2 minutes left foot only     Tandem Walking on Foam Pad x 15 reps in between parallel bars   Single Leg Stance on airex on turf throwing red ball against trampoline x X30 left LOWER EXTREMITY only          Plan for Next Visit:      THERAPEUTIC ACTIVITIES: to improve functional performance through dynamic activities, for (14)  minutes including:   x = performed today    Therapeutic Activities 5/5/2023    Single Leg Stance  Floor  Airex     x 3x40 second holds   BAPS Board x X20 clockwise and counter clockwise Level 2   Single Leg Squat Shuttle x 4 bands, x30 left LOWER EXTREMITY only   Squats on Bosu x 3x10 with UPPER EXTREMITY support    BOLD = new this visit  Plan for Next Visit: progress as tolerated       PATIENT EDUCATION AND HOME EXERCISES     Home Exercises Provided and Patient Education Provided     Education provided: (during session) minutes  PURPOSE: Patient educated on the impairments noted above and the effects of physical therapy intervention to improve overall condition and QOL.   EXERCISE: Patient was educated on all the above exercise prior/during/after for proper posture, positioning, and execution for safe performance with home exercise program.   STRENGTH: Patient educated on  the importance of improved core and extremity strength in order to improve alignment of the spine and extremities with static positions and dynamic movement.   4/27/2023: added home exercise program to my chart and gave patient paper copy.      Written Home Exercises Provided: yes.  Exercises were reviewed and Hira was able to demonstrate them prior to the end of the session.  Hira demonstrated good  understanding of the education provided. See EMR under Patient Instructions for exercises provided during therapy sessions.    ASSESSMENT     Progressed patient with single leg and tandem balance as well as strength and patient tolerated very well. Plan to start incorporating more functional strength and challenging balance and stability as tolerated. No adverse effects noted.    Hira is progressing well towards his goals.   Patient prognosis is Good.     Patient will continue to benefit from skilled outpatient physical therapy to address the deficits listed in the problem list box on initial evaluation, provide pt/family education and to maximize patient's level of independence in the home and community environment.     Patient's spiritual, cultural and educational needs considered and patient agreeable to plan of care and goals.     Anticipated Barriers for therapy: sedentary lifestyle    GOALS:  Reviewed: 05/05/2023     Short Term Goals:  6 weeks Status  Date Met   PAIN: Patient will report worst pain of 4/10 in order to progress toward max functional ability and improve quality of life. [x] Progressing  [] Met  [] Not Met     FUNCTION: Patient will demonstrate improved function as indicated by a functional limitation score of less than or equal to 35 out of 100 on FOTO. [x] Progressing  [] Met  [] Not Met     MOBILITY: Patient will improve Range of Motion to 50% of stated goals, listed in objective measures above, in order to progress towards independence with functional activities.  [x] Progressing  []  Met  [] Not Met     STRENGTH: Patient will improve strength to 50% of stated goals, listed in objective measures above, in order to progress towards independence with functional activities.  [x] Progressing  [] Met  [] Not Met        Long Term Goals:  12 weeks Status Date Met   PAIN: Patient will report worst pain of 2/10 in order to progress toward max functional ability and improve quality of life [x] Progressing  [] Met  [] Not Met     FUNCTION: Patient will demonstrate improved function as indicated by a functional limitation score of less than or equal to 27 out of 100 on FOTO. [x] Progressing  [] Met  [] Not Met     MOBILITY: Patient will improve Range of Motion to stated goals, listed in objective measures above, in order to return to maximal functional potential and improve quality of life. [x] Progressing  [] Met  [] Not Met     STRENGTH: Patient will improve strength to stated goals, listed in objective measures above, in order to improve functional independence and quality of life. [x] Progressing  [] Met  [] Not Met     Patient will return to normal ADL's, IADL's, community involvement, recreational activities, and work-related activities with less than or equal to 2/10 pain and maximal function.  [x] Progressing  [] Met  [] Not Met           PLAN     Continue Plan of Care (POC) and progress per patient tolerance. See treatment section for details on planned progressions next session.      Rea Manuel, PT

## 2023-05-09 ENCOUNTER — DOCUMENTATION ONLY (OUTPATIENT)
Dept: REHABILITATION | Facility: HOSPITAL | Age: 38
End: 2023-05-09
Payer: COMMERCIAL

## 2023-05-09 NOTE — PROGRESS NOTES
PT/PTA met face to face to discuss pt's treatment plan and progress towards established goals. Pt will be seen by a physical therapist minimally every 6th visit or every 30 days.        Bernard Lozano PTA

## 2023-05-15 ENCOUNTER — CLINICAL SUPPORT (OUTPATIENT)
Dept: REHABILITATION | Facility: HOSPITAL | Age: 38
End: 2023-05-15
Payer: COMMERCIAL

## 2023-05-15 DIAGNOSIS — M25.60 DECREASED RANGE OF MOTION: Primary | ICD-10-CM

## 2023-05-15 DIAGNOSIS — Z74.09 DECREASED FUNCTIONAL MOBILITY AND ENDURANCE: ICD-10-CM

## 2023-05-15 DIAGNOSIS — R53.1 DECREASED STRENGTH: ICD-10-CM

## 2023-05-15 PROCEDURE — 97110 THERAPEUTIC EXERCISES: CPT

## 2023-05-15 PROCEDURE — 97530 THERAPEUTIC ACTIVITIES: CPT

## 2023-05-15 PROCEDURE — 97112 NEUROMUSCULAR REEDUCATION: CPT

## 2023-05-17 ENCOUNTER — CLINICAL SUPPORT (OUTPATIENT)
Dept: REHABILITATION | Facility: HOSPITAL | Age: 38
End: 2023-05-17
Payer: COMMERCIAL

## 2023-05-17 DIAGNOSIS — Z74.09 DECREASED FUNCTIONAL MOBILITY AND ENDURANCE: ICD-10-CM

## 2023-05-17 DIAGNOSIS — M25.60 DECREASED RANGE OF MOTION: Primary | ICD-10-CM

## 2023-05-17 DIAGNOSIS — R53.1 DECREASED STRENGTH: ICD-10-CM

## 2023-05-17 PROCEDURE — 97110 THERAPEUTIC EXERCISES: CPT

## 2023-05-17 PROCEDURE — 97530 THERAPEUTIC ACTIVITIES: CPT

## 2023-05-17 PROCEDURE — 97112 NEUROMUSCULAR REEDUCATION: CPT

## 2023-05-17 PROCEDURE — 97140 MANUAL THERAPY 1/> REGIONS: CPT

## 2023-05-17 NOTE — PROGRESS NOTES
[facility]   Physical Therapy Treatment Note     Name: Hira Riggs  Clinic Number: [id number]    Therapy Diagnosis:   Encounter Diagnoses   Name Primary?    Decreased range of motion Yes    Decreased strength     Decreased functional mobility and endurance      Physician: Naina Floyd PA-C    Visit Date: 5/17/2023    Physician Orders: Physical Therapy Evaluation and Treat  Medical Diagnosis from Referral:   S93.492A (ICD-10-CM) - Sprain of anterior talofibular ligament of left ankle, initial encounter   S93.409A (ICD-10-CM) - Sprain of lateral ligament of ankle joint   Evaluation Date: 4/24/2023  Authorization Period Expiration: 04/16/2024  Plan of Care Expiration: 07/17/2023  Progress Note Due: 05/24/2023  Visit # / Visits authorized: 5/20 (+ evaluation)  FOTO: 1/3      Precautions: Standard    PTA Visit #: 0/5     Time In: 4:45 PM  Time Out: 5:40 PM  Total Billable Time: 55 minutes (Billing reflects 1 on 1 treatment time spent with patient)    SUBJECTIVE     Patient reports: he did have some lateral ankle burning sensations after last visit that only lasted an hour or two and then also reported some tibialis posterior tendon discomfort. Patient states this was short lived and he is no longer experiencing this pain.    He N/A compliant with home exercise program.    Response to previous treatment: good, but did have some minor soreness from jogging    Functional change: not running, difficulty with full kneeling and squatting, able to sit on heels without pain     Pain: 0/10     Location: left foot    OBJECTIVE     Objective Measures updated at progress report or POC update only unless otherwise noted.       TREATMENT     Hira received the treatments listed below:     MANUAL THERAPY TECHNIQUES were applied for (0) minutes, including:    Manual Intervention Performed Today    Soft Tissue Mobilization []      Joint Mobilizations []     []     []    Functional Dry Needling   []      Plan for Next Visit: Continue as needed       THERAPEUTIC EXERCISES to develop strength, endurance, ROM, flexibility, posture, and core stabilization for (10) minutes including:    Intervention Performed Today    Upright Bike x 5 mintues   Ankle active range of motion   Red band added with all exercises today        3 x 10 dorsiflexion bilateral   3 x 10 plantarflexion bilateral   3 x 10 inversion bilateral   3 x 10 eversion bilateral    Toe flexion with towel  X 30    DORSIFLEXION lunge stretch  10x with 10 second holds left green band   Education on timeline to heal and return to run x 5 minutes                    Plan for Next Visit:        NEUROMUSCULAR RE-EDUCATION ACTIVITIES to improve Balance, Coordination, Kinesthetic, Sense, Proprioception, and Posture for (13) minutes.  The following were included:    Intervention Performed Today    Arch press up  3 x 10 sitting   Toe yoga  3 x 10 each great toe extension and 2-5 toes extension independent of one another   Gastrocnemius stretch x 2 minutes bilateral     Tandem Walking on Foam Pad  15 reps in between parallel bars   Single Leg Stance on airex on turf throwing red ball against trampoline  X30 left LOWER EXTREMITY only   Single Leg stance on small bosu x 3x30 bilateral, extra rest breaks     Plan for Next Visit:      THERAPEUTIC ACTIVITIES: to improve functional performance through dynamic activities, for (19)  minutes including:   x = performed today    Therapeutic Activities 5/17/2023    Single Leg Stance  Floor  Airex      3x40 second holds   BAPS Board x X20 clockwise and counter clockwise Level 3   Single Leg Squat Shuttle  4 bands, x30 left LOWER EXTREMITY only   Squats on Bosu x 3x10 with OUT UPPER EXTREMITY support   Single Leg RDL's to black mat x 2x10 bilateral   Treadmill   Walk at 3.0mph (6% incline)     x Patient with no pain reported today    BOLD = new this visit  Plan for Next Visit: progress as tolerated     MANUAL THERAPY TECHNIQUES:  Joint mobilizations, Soft tissue Mobilization, and mobilization with movement were applied to the area listed below for (15) minutes, including: x = exercises performed     Manual Intervention 5/17/2023    Soft Tissue Mobilization     Joint Mobilizations x Talocrural distractions and A-P mobilizations   Mobilization with movement     Functional Dry Needling      BOLD = new this visit  Plan for Next Visit: as needed        PATIENT EDUCATION AND HOME EXERCISES     Home Exercises Provided and Patient Education Provided     Education provided: (during session) minutes  PURPOSE: Patient educated on the impairments noted above and the effects of physical therapy intervention to improve overall condition and QOL.   EXERCISE: Patient was educated on all the above exercise prior/during/after for proper posture, positioning, and execution for safe performance with home exercise program.   STRENGTH: Patient educated on the importance of improved core and extremity strength in order to improve alignment of the spine and extremities with static positions and dynamic movement.   4/27/2023: added home exercise program to my chart and gave patient paper copy.    Written Home Exercises Provided: yes.  Exercises were reviewed and Hira was able to demonstrate them prior to the end of the session.  Hira demonstrated good  understanding of the education provided. See EMR under Patient Instructions for exercises provided during therapy sessions.    ASSESSMENT     Due to recent symptoms, patient attempted walking on incline instead of jogging and patient tolerated this well with no reports of discomfort. Increased challenge on single leg stance and patient tolerated this well. Plan to continue monitoring symptoms and progressing as tolerated; no adverse effects noted.    Hira is progressing well towards his goals.   Patient prognosis is Good.     Patient will continue to benefit from skilled outpatient physical therapy to  address the deficits listed in the problem list box on initial evaluation, provide pt/family education and to maximize patient's level of independence in the home and community environment.     Patient's spiritual, cultural and educational needs considered and patient agreeable to plan of care and goals.     Anticipated Barriers for therapy: sedentary lifestyle    GOALS:  Reviewed: 05/17/2023     Short Term Goals:  6 weeks Status  Date Met   PAIN: Patient will report worst pain of 4/10 in order to progress toward max functional ability and improve quality of life. [x] Progressing  [] Met  [] Not Met     FUNCTION: Patient will demonstrate improved function as indicated by a functional limitation score of less than or equal to 35 out of 100 on FOTO. [x] Progressing  [] Met  [] Not Met     MOBILITY: Patient will improve Range of Motion to 50% of stated goals, listed in objective measures above, in order to progress towards independence with functional activities.  [x] Progressing  [] Met  [] Not Met     STRENGTH: Patient will improve strength to 50% of stated goals, listed in objective measures above, in order to progress towards independence with functional activities.  [x] Progressing  [] Met  [] Not Met        Long Term Goals:  12 weeks Status Date Met   PAIN: Patient will report worst pain of 2/10 in order to progress toward max functional ability and improve quality of life [x] Progressing  [] Met  [] Not Met     FUNCTION: Patient will demonstrate improved function as indicated by a functional limitation score of less than or equal to 27 out of 100 on FOTO. [x] Progressing  [] Met  [] Not Met     MOBILITY: Patient will improve Range of Motion to stated goals, listed in objective measures above, in order to return to maximal functional potential and improve quality of life. [x] Progressing  [] Met  [] Not Met     STRENGTH: Patient will improve strength to stated goals, listed in objective measures above, in order  to improve functional independence and quality of life. [x] Progressing  [] Met  [] Not Met     Patient will return to normal ADL's, IADL's, community involvement, recreational activities, and work-related activities with less than or equal to 2/10 pain and maximal function.  [x] Progressing  [] Met  [] Not Met           PLAN     Continue Plan of Care (POC) and progress per patient tolerance. See treatment section for details on planned progressions next session.      Rea Manuel, PT

## 2023-05-22 ENCOUNTER — CLINICAL SUPPORT (OUTPATIENT)
Dept: REHABILITATION | Facility: HOSPITAL | Age: 38
End: 2023-05-22
Payer: COMMERCIAL

## 2023-05-22 DIAGNOSIS — Z74.09 DECREASED FUNCTIONAL MOBILITY AND ENDURANCE: ICD-10-CM

## 2023-05-22 DIAGNOSIS — R53.1 DECREASED STRENGTH: ICD-10-CM

## 2023-05-22 DIAGNOSIS — M25.60 DECREASED RANGE OF MOTION: Primary | ICD-10-CM

## 2023-05-22 PROCEDURE — 97110 THERAPEUTIC EXERCISES: CPT

## 2023-05-22 PROCEDURE — 97530 THERAPEUTIC ACTIVITIES: CPT

## 2023-05-22 PROCEDURE — 97112 NEUROMUSCULAR REEDUCATION: CPT

## 2023-05-22 NOTE — PROGRESS NOTES
[facility]   Physical Therapy Treatment Note     Name: Hira Riggs  Clinic Number: [id number]    Therapy Diagnosis:   Encounter Diagnoses   Name Primary?    Decreased range of motion Yes    Decreased strength     Decreased functional mobility and endurance      Physician: Naina Floyd PA-C    Visit Date: 5/22/2023    Physician Orders: Physical Therapy Evaluation and Treat  Medical Diagnosis from Referral:   S93.492A (ICD-10-CM) - Sprain of anterior talofibular ligament of left ankle, initial encounter   S93.409A (ICD-10-CM) - Sprain of lateral ligament of ankle joint   Evaluation Date: 4/24/2023  Authorization Period Expiration: 04/16/2024  Plan of Care Expiration: 07/17/2023  Progress Note Due: 05/24/2023  Visit # / Visits authorized: 6/20 (+ evaluation)  FOTO: 1/3      Precautions: Standard    PTA Visit #: 0/5     Time In: 4:15 PM  Time Out: 5:10 PM  Total Billable Time: 55 minutes (Billing reflects 1 on 1 treatment time spent with patient)    SUBJECTIVE     Patient reports: he is doing pretty well and has been walking on an incline at home more trying to build up his endurance. Patient still reports feeling some left ankle joint stiffness and would like to focus on this more.    He N/A compliant with home exercise program.    Response to previous treatment: good    Functional change: not running, difficulty with full kneeling and squatting, able to sit on heels without pain     Pain: 0/10     Location: left foot    OBJECTIVE     Objective Measures updated at progress report or POC update only unless otherwise noted.       TREATMENT     Hira received the treatments listed below:     MANUAL THERAPY TECHNIQUES were applied for (0) minutes, including:    Manual Intervention Performed Today    Soft Tissue Mobilization []      Joint Mobilizations []     []     []    Functional Dry Needling  []      Plan for Next Visit: Continue as needed       THERAPEUTIC EXERCISES to  develop strength, endurance, ROM, flexibility, posture, and core stabilization for (13) minutes including:    Intervention Performed Today    Upright Bike  5 mintues   Treadmill Walking  3.5 mph, 6% incline x 8 minutes. No pain reported today   Soleus Calf Raises x 3x10 bilateral   Ankle active range of motion   Red band added with all exercises today        3 x 10 dorsiflexion bilateral   3 x 10 plantarflexion bilateral   3 x 10 inversion bilateral   3 x 10 eversion bilateral    Toe flexion with towel  X 30    DORSIFLEXION lunge stretch  10x with 10 second holds left green band   Education on timeline to heal and return to run  5 minutes                    Plan for Next Visit:        NEUROMUSCULAR RE-EDUCATION ACTIVITIES to improve Balance, Coordination, Kinesthetic, Sense, Proprioception, and Posture for (17) minutes.  The following were included:    Intervention Performed Today    Arch press up  3 x 10 sitting   Toe yoga  3 x 10 each great toe extension and 2-5 toes extension independent of one another   Gastrocnemius stretch x 2 minutes bilateral     Soleus Stretch x 2 minutes bilateral   Tandem Walking on Foam Pad  15 reps in between parallel bars   Single Leg Stance on airex on turf throwing red ball against trampoline  X30 left LOWER EXTREMITY only   Single Leg stance on small bosu  3x30 bilateral, extra rest breaks   Lunges on BOSU x 2x10 bilateral     Plan for Next Visit:      THERAPEUTIC ACTIVITIES: to improve functional performance through dynamic activities, for (25)  minutes including:   x = performed today    Therapeutic Activities 5/22/2023    Single Leg Stance  Floor  Airex      3x40 second holds   Single Leg Calf Raise on stairs x Focus on stretch, x20 with 5 second holds   BAPS Board  X20 clockwise and counter clockwise Level 3   Single Leg Squat Shuttle x 5 bands, x20 bilateral, focus on stretch into ankle DORSIFLEXION    Squats on Bosu  3x10 with OUT UPPER EXTREMITY support   Single Leg RDL's to  floor x 2x10 bilateral 10 lbs kettlebell    BOLD = new this visit  Plan for Next Visit: progress as tolerated     MANUAL THERAPY TECHNIQUES: Joint mobilizations, Soft tissue Mobilization, and mobilization with movement were applied to the area listed below for (0) minutes, including: x = exercises performed     Manual Intervention 5/22/2023    Soft Tissue Mobilization     Joint Mobilizations  Talocrural distractions and A-P mobilizations   Mobilization with movement     Functional Dry Needling      BOLD = new this visit  Plan for Next Visit: as needed        PATIENT EDUCATION AND HOME EXERCISES     Home Exercises Provided and Patient Education Provided     Education provided: (during session) minutes  PURPOSE: Patient educated on the impairments noted above and the effects of physical therapy intervention to improve overall condition and QOL.   EXERCISE: Patient was educated on all the above exercise prior/during/after for proper posture, positioning, and execution for safe performance with home exercise program.   STRENGTH: Patient educated on the importance of improved core and extremity strength in order to improve alignment of the spine and extremities with static positions and dynamic movement.   4/27/2023: added home exercise program to my chart and gave patient paper copy.    Written Home Exercises Provided: yes.  Exercises were reviewed and Hira was able to demonstrate them prior to the end of the session.  Hira demonstrated good  understanding of the education provided. See EMR under Patient Instructions for exercises provided during therapy sessions.    ASSESSMENT     Added more functional strengthening and stretching exercises today and patient tolerated very well in addition to longer amount of time spent walking on an incline. Plan to continue monitoring symptoms and progressing as tolerated; no adverse effects noted.    Hira is progressing well towards his goals.   Patient prognosis is  Good.     Patient will continue to benefit from skilled outpatient physical therapy to address the deficits listed in the problem list box on initial evaluation, provide pt/family education and to maximize patient's level of independence in the home and community environment.     Patient's spiritual, cultural and educational needs considered and patient agreeable to plan of care and goals.     Anticipated Barriers for therapy: sedentary lifestyle    GOALS:  Reviewed: 05/22/2023     Short Term Goals:  6 weeks Status  Date Met   PAIN: Patient will report worst pain of 4/10 in order to progress toward max functional ability and improve quality of life. [x] Progressing  [] Met  [] Not Met     FUNCTION: Patient will demonstrate improved function as indicated by a functional limitation score of less than or equal to 35 out of 100 on FOTO. [x] Progressing  [] Met  [] Not Met     MOBILITY: Patient will improve Range of Motion to 50% of stated goals, listed in objective measures above, in order to progress towards independence with functional activities.  [x] Progressing  [] Met  [] Not Met     STRENGTH: Patient will improve strength to 50% of stated goals, listed in objective measures above, in order to progress towards independence with functional activities.  [x] Progressing  [] Met  [] Not Met        Long Term Goals:  12 weeks Status Date Met   PAIN: Patient will report worst pain of 2/10 in order to progress toward max functional ability and improve quality of life [x] Progressing  [] Met  [] Not Met     FUNCTION: Patient will demonstrate improved function as indicated by a functional limitation score of less than or equal to 27 out of 100 on FOTO. [x] Progressing  [] Met  [] Not Met     MOBILITY: Patient will improve Range of Motion to stated goals, listed in objective measures above, in order to return to maximal functional potential and improve quality of life. [x] Progressing  [] Met  [] Not Met     STRENGTH:  Patient will improve strength to stated goals, listed in objective measures above, in order to improve functional independence and quality of life. [x] Progressing  [] Met  [] Not Met     Patient will return to normal ADL's, IADL's, community involvement, recreational activities, and work-related activities with less than or equal to 2/10 pain and maximal function.  [x] Progressing  [] Met  [] Not Met           PLAN     Continue Plan of Care (POC) and progress per patient tolerance. See treatment section for details on planned progressions next session.      Rea Manuel, PT

## 2023-05-31 ENCOUNTER — CLINICAL SUPPORT (OUTPATIENT)
Dept: REHABILITATION | Facility: HOSPITAL | Age: 38
End: 2023-05-31
Payer: COMMERCIAL

## 2023-05-31 DIAGNOSIS — Z74.09 DECREASED FUNCTIONAL MOBILITY AND ENDURANCE: ICD-10-CM

## 2023-05-31 DIAGNOSIS — M25.60 DECREASED RANGE OF MOTION: Primary | ICD-10-CM

## 2023-05-31 DIAGNOSIS — R53.1 DECREASED STRENGTH: ICD-10-CM

## 2023-05-31 PROCEDURE — 97140 MANUAL THERAPY 1/> REGIONS: CPT

## 2023-05-31 PROCEDURE — 97112 NEUROMUSCULAR REEDUCATION: CPT

## 2023-05-31 PROCEDURE — 97110 THERAPEUTIC EXERCISES: CPT

## 2023-05-31 NOTE — PLAN OF CARE
[facility]   Physical Therapy Treatment Note + Progress Report     Name: Hira Riggs  Clinic Number: [id number]    Therapy Diagnosis:   Encounter Diagnoses   Name Primary?    Decreased range of motion Yes    Decreased strength     Decreased functional mobility and endurance      Physician: Naina Floyd PA-C    Visit Date: 5/31/2023    Physician Orders: Physical Therapy Evaluation and Treat  Medical Diagnosis from Referral:   S93.492A (ICD-10-CM) - Sprain of anterior talofibular ligament of left ankle, initial encounter   S93.409A (ICD-10-CM) - Sprain of lateral ligament of ankle joint   Evaluation Date: 4/24/2023  Authorization Period Expiration: 04/16/2024  Plan of Care Expiration: 07/17/2023  Progress Note Due: 06/30/2023  Visit # / Visits authorized: 7/20 (+ evaluation)  FOTO: 2/3      Precautions: Standard    PTA Visit #: 0/5     Time In: 4:50 PM  Time Out: 5:30 PM  Total Billable Time: 40 minutes (Billing reflects 1 on 1 treatment time spent with patient)    SUBJECTIVE     Patient reports: he has improved with PHYSICAL THERAPY and no longer has pain, but states his biggest issue right now is stiffness.    He N/A compliant with home exercise program.    Response to previous treatment: good    Functional change: not running, difficulty with full kneeling and squatting, able to sit on heels without pain     Pain: 0/10     Location: left foot    OBJECTIVE     Objective Measures updated at progress report or POC update only unless otherwise noted.     *denotes change since initial evaluation    RANGE OF MOTION:     Ankle/Foot AROM/PROM Right Left Pain/Dysfunction with Movement Goal   Dorsiflexion (20) WITHIN FUNCTIONAL LIMITS  WITHIN FUNCTIONAL LIMITS    -   Plantarflexion (50) WITHIN FUNCTIONAL LIMITS  WITHIN FUNCTIONAL LIMITS    -   Inversion (35) WITHIN FUNCTIONAL LIMITS  WITHIN FUNCTIONAL LIMITS    -   Eversion (15) WITHIN FUNCTIONAL LIMITS  WITHIN FUNCTIONAL LIMITS    -    Ankle DORSIFLEXION lunge test 2 inches to great toe from wall 1.4 inch from great toe to wall* Pain in anterior ankle 2 inches to great toe from wall         STRENGTH:     L/E MMT Right  (spine) Left Pain/Dysfunction with Movement Goal   Hip Flexion  5/5 5/5   4+/5 Bilateral   Hip Extension  5/5 5/5   4+/5 Bilateral   Hip Abduction  5/5 5/5   4+/5 Bilateral   Knee Extension 5/5 5/5   5/5 Bilateral   Knee Flexion 5/5 5/5   5/5 Bilateral   Ankle Dorsiflexion 5/5 4+/5   5/5 Bilateral   Ankle Plantarflexion 5/5 4+/5   5/5 Bilateral   Ankle Inversion 5/5 4/5*   5/5 Bilateral   Ankle Eversion 5/5 4/5*   5/5 Bilateral         JOINT MOBILITY:      Joint Motion Tested Right    Left  Goal   Talocrural  Normal Hypomobile Normal Bilateral      FUNCTION:      CMS Impairment/Limitation/Restriction for FOTO Ankle Sprain/Strain Survey     Therapist reviewed FOTO scores for Hira on 4/24/2023.   FOTO documents entered into [employer] - see Media section.     Limitation Score: 27%*           TREATMENT     Hira received the treatments listed below:     MANUAL THERAPY TECHNIQUES were applied for (0) minutes, including:    Manual Intervention Performed Today    Soft Tissue Mobilization []      Joint Mobilizations []     []     []    Functional Dry Needling  []      Plan for Next Visit: Continue as needed       THERAPEUTIC EXERCISES to develop strength, endurance, ROM, flexibility, posture, and core stabilization for (18) minutes including:    Intervention Performed Today    Upright Bike x 5 mintues   Treadmill Walking  3.5 mph, 6% incline  8 minutes. No pain reported today   Soleus Calf Raises  3x10 bilateral   Ankle active range of motion   Red band added with all exercises today        3 x 10 dorsiflexion bilateral   3 x 10 plantarflexion bilateral   3 x 10 inversion bilateral   3 x 10 eversion bilateral    Toe flexion with towel  X 30    DORSIFLEXION lunge stretch x 10x with 10 second holds left blue band   Education on timeline  to heal and return to run  5 minutes   Objectives re-assessed x 10 minutes               Plan for Next Visit:        NEUROMUSCULAR RE-EDUCATION ACTIVITIES to improve Balance, Coordination, Kinesthetic, Sense, Proprioception, and Posture for (8) minutes.  The following were included:    Intervention Performed Today    Arch press up  3 x 10 sitting   Toe yoga  3 x 10 each great toe extension and 2-5 toes extension independent of one another   Gastrocnemius stretch x 2 minutes bilateral     Standing Tibialis Anterior Raises x 3x10   Soleus Stretch  2 minutes bilateral   Tandem Walking on Foam Pad  15 reps in between parallel bars   Single Leg Stance on airex on turf throwing red ball against trampoline  X30 left LOWER EXTREMITY only   Single Leg stance on small bosu  3x30 bilateral, extra rest breaks   Lunges on BOSU  2x10 bilateral     Plan for Next Visit:      THERAPEUTIC ACTIVITIES: to improve functional performance through dynamic activities, for (0)  minutes including:   x = performed today    Therapeutic Activities 5/31/2023    Single Leg Stance  Floor  Airex      3x40 second holds   Single Leg Calf Raise on stairs  Focus on stretch, x20 with 5 second holds   BAPS Board  X20 clockwise and counter clockwise Level 3   Single Leg Squat Shuttle  5 bands, x20 bilateral, focus on stretch into ankle DORSIFLEXION    Squats on Bosu  3x10 with OUT UPPER EXTREMITY support   Single Leg RDL's to floor  2x10 bilateral 10 lbs kettlebell    BOLD = new this visit  Plan for Next Visit: progress as tolerated     MANUAL THERAPY TECHNIQUES: Joint mobilizations, Soft tissue Mobilization, and mobilization with movement were applied to the area listed below for (14) minutes, including: x = exercises performed     Manual Intervention 5/31/2023    Soft Tissue Mobilization     Joint Mobilizations x Talocrural distractions and A-P mobilizations. Thurst manipulation to talocrural joint   Mobilization with movement     Functional Dry Needling       BOLD = new this visit  Plan for Next Visit: as needed        PATIENT EDUCATION AND HOME EXERCISES     Home Exercises Provided and Patient Education Provided     Education provided: (during session) minutes  PURPOSE: Patient educated on the impairments noted above and the effects of physical therapy intervention to improve overall condition and QOL.   EXERCISE: Patient was educated on all the above exercise prior/during/after for proper posture, positioning, and execution for safe performance with home exercise program.   STRENGTH: Patient educated on the importance of improved core and extremity strength in order to improve alignment of the spine and extremities with static positions and dynamic movement.   4/27/2023: added home exercise program to my chart and gave patient paper copy.    Written Home Exercises Provided: yes.  Exercises were reviewed and Hira was able to demonstrate them prior to the end of the session.  Hira demonstrated good  understanding of the education provided. See EMR under Patient Instructions for exercises provided during therapy sessions.    ASSESSMENT     Since beginning PHYSICAL THERAPY patient demonstrates moderate improvements in ankle DORSIFLEXION ACTIVE RANGE OF MOTION as well as left ankle INVERSION/EVERSION strength. Patient still with reported stiffness, but no longer pain. Patient has met functional goals per FOTO scores. Patient would continue to benefit from skilled therapy to address his remaining deficits and improve his stiffness and RANGE OF MOTION as well as strength.     iHra is progressing well towards his goals.   Patient prognosis is Good.     Patient will continue to benefit from skilled outpatient physical therapy to address the deficits listed in the problem list box on initial evaluation, provide pt/family education and to maximize patient's level of independence in the home and community environment.     Patient's spiritual, cultural and  educational needs considered and patient agreeable to plan of care and goals.     Anticipated Barriers for therapy: sedentary lifestyle    GOALS:  Reviewed: 05/31/2023     Short Term Goals:  6 weeks Status  Date Met   PAIN: Patient will report worst pain of 4/10 in order to progress toward max functional ability and improve quality of life. [] Progressing  [x] Met  [] Not Met 05/31/2023    FUNCTION: Patient will demonstrate improved function as indicated by a functional limitation score of less than or equal to 35 out of 100 on FOTO. [] Progressing  [x] Met  [] Not Met  05/31/2023    MOBILITY: Patient will improve Range of Motion to 50% of stated goals, listed in objective measures above, in order to progress towards independence with functional activities.  [] Progressing  [x] Met  [] Not Met 05/31/2023     STRENGTH: Patient will improve strength to 50% of stated goals, listed in objective measures above, in order to progress towards independence with functional activities.  [] Progressing  [x] Met  [] Not Met 05/31/2023        Long Term Goals:  12 weeks Status Date Met   PAIN: Patient will report worst pain of 2/10 in order to progress toward max functional ability and improve quality of life [] Progressing  [x] Met  [] Not Met 05/31/2023     FUNCTION: Patient will demonstrate improved function as indicated by a functional limitation score of less than or equal to 27 out of 100 on FOTO. [] Progressing  [x] Met  [] Not Met 05/31/2023   MOBILITY: Patient will improve Range of Motion to stated goals, listed in objective measures above, in order to return to maximal functional potential and improve quality of life. [x] Progressing  [] Met  [] Not Met     STRENGTH: Patient will improve strength to stated goals, listed in objective measures above, in order to improve functional independence and quality of life. [x] Progressing  [] Met  [] Not Met     Patient will return to normal ADL's, IADL's, community involvement,  recreational activities, and work-related activities with less than or equal to 2/10 pain and maximal function.  [x] Progressing  [] Met  [] Not Met           PLAN     Continue Plan of Care (POC) and progress per patient tolerance. See treatment section for details on planned progressions next session.      Rea Manuel, PT

## 2023-05-31 NOTE — PROGRESS NOTES
[facility]   Physical Therapy Treatment Note + Progress Report     Name: Hira Riggs  Clinic Number: [id number]    Therapy Diagnosis:   Encounter Diagnoses   Name Primary?    Decreased range of motion Yes    Decreased strength     Decreased functional mobility and endurance      Physician: Naian Floyd PA-C    Visit Date: 5/31/2023    Physician Orders: Physical Therapy Evaluation and Treat  Medical Diagnosis from Referral:   S93.492A (ICD-10-CM) - Sprain of anterior talofibular ligament of left ankle, initial encounter   S93.409A (ICD-10-CM) - Sprain of lateral ligament of ankle joint   Evaluation Date: 4/24/2023  Authorization Period Expiration: 04/16/2024  Plan of Care Expiration: 07/17/2023  Progress Note Due: 06/30/2023  Visit # / Visits authorized: 7/20 (+ evaluation)  FOTO: 2/3      Precautions: Standard    PTA Visit #: 0/5     Time In: 4:50 PM  Time Out: 5:30 PM  Total Billable Time: 40 minutes (Billing reflects 1 on 1 treatment time spent with patient)    SUBJECTIVE     Patient reports: he has improved with PHYSICAL THERAPY and no longer has pain, but states his biggest issue right now is stiffness.    He N/A compliant with home exercise program.    Response to previous treatment: good    Functional change: not running, difficulty with full kneeling and squatting, able to sit on heels without pain     Pain: 0/10     Location: left foot    OBJECTIVE     Objective Measures updated at progress report or POC update only unless otherwise noted.     *denotes change since initial evaluation    RANGE OF MOTION:     Ankle/Foot AROM/PROM Right Left Pain/Dysfunction with Movement Goal   Dorsiflexion (20) WITHIN FUNCTIONAL LIMITS  WITHIN FUNCTIONAL LIMITS    -   Plantarflexion (50) WITHIN FUNCTIONAL LIMITS  WITHIN FUNCTIONAL LIMITS    -   Inversion (35) WITHIN FUNCTIONAL LIMITS  WITHIN FUNCTIONAL LIMITS    -   Eversion (15) WITHIN FUNCTIONAL LIMITS  WITHIN FUNCTIONAL LIMITS    -    Ankle DORSIFLEXION lunge test 2 inches to great toe from wall 1.4 inch from great toe to wall* Pain in anterior ankle 2 inches to great toe from wall         STRENGTH:     L/E MMT Right  (spine) Left Pain/Dysfunction with Movement Goal   Hip Flexion  5/5 5/5   4+/5 Bilateral   Hip Extension  5/5 5/5   4+/5 Bilateral   Hip Abduction  5/5 5/5   4+/5 Bilateral   Knee Extension 5/5 5/5   5/5 Bilateral   Knee Flexion 5/5 5/5   5/5 Bilateral   Ankle Dorsiflexion 5/5 4+/5   5/5 Bilateral   Ankle Plantarflexion 5/5 4+/5   5/5 Bilateral   Ankle Inversion 5/5 4/5*   5/5 Bilateral   Ankle Eversion 5/5 4/5*   5/5 Bilateral         JOINT MOBILITY:      Joint Motion Tested Right    Left  Goal   Talocrural  Normal Hypomobile Normal Bilateral      FUNCTION:      CMS Impairment/Limitation/Restriction for FOTO Ankle Sprain/Strain Survey     Therapist reviewed FOTO scores for Hira on 4/24/2023.   FOTO documents entered into [employer] - see Media section.     Limitation Score: 27%*           TREATMENT     Hira received the treatments listed below:     MANUAL THERAPY TECHNIQUES were applied for (0) minutes, including:    Manual Intervention Performed Today    Soft Tissue Mobilization []      Joint Mobilizations []     []     []    Functional Dry Needling  []      Plan for Next Visit: Continue as needed       THERAPEUTIC EXERCISES to develop strength, endurance, ROM, flexibility, posture, and core stabilization for (18) minutes including:    Intervention Performed Today    Upright Bike x 5 mintues   Treadmill Walking  3.5 mph, 6% incline  8 minutes. No pain reported today   Soleus Calf Raises  3x10 bilateral   Ankle active range of motion   Red band added with all exercises today        3 x 10 dorsiflexion bilateral   3 x 10 plantarflexion bilateral   3 x 10 inversion bilateral   3 x 10 eversion bilateral    Toe flexion with towel  X 30    DORSIFLEXION lunge stretch x 10x with 10 second holds left blue band   Education on timeline  to heal and return to run  5 minutes   Objectives re-assessed x 10 minutes               Plan for Next Visit:        NEUROMUSCULAR RE-EDUCATION ACTIVITIES to improve Balance, Coordination, Kinesthetic, Sense, Proprioception, and Posture for (8) minutes.  The following were included:    Intervention Performed Today    Arch press up  3 x 10 sitting   Toe yoga  3 x 10 each great toe extension and 2-5 toes extension independent of one another   Gastrocnemius stretch x 2 minutes bilateral     Standing Tibialis Anterior Raises x 3x10   Soleus Stretch  2 minutes bilateral   Tandem Walking on Foam Pad  15 reps in between parallel bars   Single Leg Stance on airex on turf throwing red ball against trampoline  X30 left LOWER EXTREMITY only   Single Leg stance on small bosu  3x30 bilateral, extra rest breaks   Lunges on BOSU  2x10 bilateral     Plan for Next Visit:      THERAPEUTIC ACTIVITIES: to improve functional performance through dynamic activities, for (0)  minutes including:   x = performed today    Therapeutic Activities 5/31/2023    Single Leg Stance  Floor  Airex      3x40 second holds   Single Leg Calf Raise on stairs  Focus on stretch, x20 with 5 second holds   BAPS Board  X20 clockwise and counter clockwise Level 3   Single Leg Squat Shuttle  5 bands, x20 bilateral, focus on stretch into ankle DORSIFLEXION    Squats on Bosu  3x10 with OUT UPPER EXTREMITY support   Single Leg RDL's to floor  2x10 bilateral 10 lbs kettlebell    BOLD = new this visit  Plan for Next Visit: progress as tolerated     MANUAL THERAPY TECHNIQUES: Joint mobilizations, Soft tissue Mobilization, and mobilization with movement were applied to the area listed below for (14) minutes, including: x = exercises performed     Manual Intervention 5/31/2023    Soft Tissue Mobilization     Joint Mobilizations x Talocrural distractions and A-P mobilizations. Thurst manipulation to talocrural joint   Mobilization with movement     Functional Dry Needling       BOLD = new this visit  Plan for Next Visit: as needed        PATIENT EDUCATION AND HOME EXERCISES     Home Exercises Provided and Patient Education Provided     Education provided: (during session) minutes  PURPOSE: Patient educated on the impairments noted above and the effects of physical therapy intervention to improve overall condition and QOL.   EXERCISE: Patient was educated on all the above exercise prior/during/after for proper posture, positioning, and execution for safe performance with home exercise program.   STRENGTH: Patient educated on the importance of improved core and extremity strength in order to improve alignment of the spine and extremities with static positions and dynamic movement.   4/27/2023: added home exercise program to my chart and gave patient paper copy.    Written Home Exercises Provided: yes.  Exercises were reviewed and Hira was able to demonstrate them prior to the end of the session.  Hira demonstrated good  understanding of the education provided. See EMR under Patient Instructions for exercises provided during therapy sessions.    ASSESSMENT     Since beginning PHYSICAL THERAPY patient demonstrates moderate improvements in ankle DORSIFLEXION ACTIVE RANGE OF MOTION as well as left ankle INVERSION/EVERSION strength. Patient still with reported stiffness, but no longer pain. Patient has met functional goals per FOTO scores. Patient would continue to benefit from skilled therapy to address his remaining deficits and improve his stiffness and RANGE OF MOTION as well as strength.     Hira is progressing well towards his goals.   Patient prognosis is Good.     Patient will continue to benefit from skilled outpatient physical therapy to address the deficits listed in the problem list box on initial evaluation, provide pt/family education and to maximize patient's level of independence in the home and community environment.     Patient's spiritual, cultural and  educational needs considered and patient agreeable to plan of care and goals.     Anticipated Barriers for therapy: sedentary lifestyle    GOALS:  Reviewed: 05/31/2023     Short Term Goals:  6 weeks Status  Date Met   PAIN: Patient will report worst pain of 4/10 in order to progress toward max functional ability and improve quality of life. [] Progressing  [x] Met  [] Not Met 05/31/2023    FUNCTION: Patient will demonstrate improved function as indicated by a functional limitation score of less than or equal to 35 out of 100 on FOTO. [] Progressing  [x] Met  [] Not Met  05/31/2023    MOBILITY: Patient will improve Range of Motion to 50% of stated goals, listed in objective measures above, in order to progress towards independence with functional activities.  [] Progressing  [x] Met  [] Not Met 05/31/2023     STRENGTH: Patient will improve strength to 50% of stated goals, listed in objective measures above, in order to progress towards independence with functional activities.  [] Progressing  [x] Met  [] Not Met 05/31/2023        Long Term Goals:  12 weeks Status Date Met   PAIN: Patient will report worst pain of 2/10 in order to progress toward max functional ability and improve quality of life [] Progressing  [x] Met  [] Not Met 05/31/2023     FUNCTION: Patient will demonstrate improved function as indicated by a functional limitation score of less than or equal to 27 out of 100 on FOTO. [] Progressing  [x] Met  [] Not Met 05/31/2023   MOBILITY: Patient will improve Range of Motion to stated goals, listed in objective measures above, in order to return to maximal functional potential and improve quality of life. [x] Progressing  [] Met  [] Not Met     STRENGTH: Patient will improve strength to stated goals, listed in objective measures above, in order to improve functional independence and quality of life. [x] Progressing  [] Met  [] Not Met     Patient will return to normal ADL's, IADL's, community involvement,  recreational activities, and work-related activities with less than or equal to 2/10 pain and maximal function.  [x] Progressing  [] Met  [] Not Met           PLAN     Continue Plan of Care (POC) and progress per patient tolerance. See treatment section for details on planned progressions next session.      Rea Manuel, PT

## 2023-06-28 ENCOUNTER — DOCUMENTATION ONLY (OUTPATIENT)
Dept: REHABILITATION | Facility: HOSPITAL | Age: 38
End: 2023-06-28
Payer: COMMERCIAL

## 2023-06-28 PROBLEM — R53.1 DECREASED STRENGTH: Status: RESOLVED | Noted: 2023-04-24 | Resolved: 2023-06-28

## 2023-06-28 PROBLEM — Z74.09 DECREASED FUNCTIONAL MOBILITY AND ENDURANCE: Status: RESOLVED | Noted: 2023-04-24 | Resolved: 2023-06-28

## 2023-06-28 PROBLEM — M25.60 DECREASED RANGE OF MOTION: Status: RESOLVED | Noted: 2023-04-24 | Resolved: 2023-06-28

## 2023-06-28 NOTE — PROGRESS NOTES
[facility] OUTPATIENT THERAPY AND WELLNESS  Physical Therapy Discharge Note    Name: Hira Riggs  Clinic Number: [id number]    Therapy Diagnosis: No diagnosis found.  Physician: No ref. provider found    Physician Orders: Physical Therapy Evaluation and Treat  Medical Diagnosis from Referral:   S93.492A (ICD-10-CM) - Sprain of anterior talofibular ligament of left ankle, initial encounter   S93.409A (ICD-10-CM) - Sprain of lateral ligament of ankle joint   Evaluation Date: 4/24/2023    Date of Last visit: 05/31/2023  Total Visits Received: 7    ASSESSMENT        Discharge reason: Patient has not attended therapy since 05/31/2023    Discharge FOTO Score: not applicable     Goals: see last treatment note    PLAN   This patient is discharged from PT.    Rea Manuel, PT, DPT

## 2023-07-25 ENCOUNTER — PATIENT OUTREACH (OUTPATIENT)
Dept: ADMINISTRATIVE | Facility: HOSPITAL | Age: 38
End: 2023-07-25
Payer: COMMERCIAL

## 2023-07-25 ENCOUNTER — PATIENT MESSAGE (OUTPATIENT)
Dept: ADMINISTRATIVE | Facility: HOSPITAL | Age: 38
End: 2023-07-25
Payer: COMMERCIAL

## 2023-07-25 NOTE — PROGRESS NOTES
Panel Continuity Report: Reached out to patient in regards to appointment with pcp. No answer, unable to LVM. Sent portal message as well.

## 2024-02-13 ENCOUNTER — OFFICE VISIT (OUTPATIENT)
Dept: PRIMARY CARE CLINIC | Facility: CLINIC | Age: 39
End: 2024-02-13
Payer: COMMERCIAL

## 2024-02-13 VITALS
SYSTOLIC BLOOD PRESSURE: 128 MMHG | HEIGHT: 65 IN | WEIGHT: 178.69 LBS | HEART RATE: 77 BPM | DIASTOLIC BLOOD PRESSURE: 92 MMHG | TEMPERATURE: 98 F | BODY MASS INDEX: 29.77 KG/M2 | OXYGEN SATURATION: 98 %

## 2024-02-13 DIAGNOSIS — J02.9 SORE THROAT: ICD-10-CM

## 2024-02-13 DIAGNOSIS — U07.1 COVID-19: Primary | ICD-10-CM

## 2024-02-13 DIAGNOSIS — R05.9 COUGH, UNSPECIFIED TYPE: ICD-10-CM

## 2024-02-13 LAB
CTP QC/QA: YES
CTP QC/QA: YES
POC MOLECULAR INFLUENZA A AGN: NEGATIVE
POC MOLECULAR INFLUENZA B AGN: NEGATIVE
SARS-COV-2 RDRP RESP QL NAA+PROBE: POSITIVE

## 2024-02-13 PROCEDURE — 3074F SYST BP LT 130 MM HG: CPT | Mod: CPTII,S$GLB,, | Performed by: FAMILY MEDICINE

## 2024-02-13 PROCEDURE — 87635 SARS-COV-2 COVID-19 AMP PRB: CPT | Mod: QW,S$GLB,, | Performed by: FAMILY MEDICINE

## 2024-02-13 PROCEDURE — 1159F MED LIST DOCD IN RCRD: CPT | Mod: CPTII,S$GLB,, | Performed by: FAMILY MEDICINE

## 2024-02-13 PROCEDURE — 3080F DIAST BP >= 90 MM HG: CPT | Mod: CPTII,S$GLB,, | Performed by: FAMILY MEDICINE

## 2024-02-13 PROCEDURE — 3008F BODY MASS INDEX DOCD: CPT | Mod: CPTII,S$GLB,, | Performed by: FAMILY MEDICINE

## 2024-02-13 PROCEDURE — 99214 OFFICE O/P EST MOD 30 MIN: CPT | Mod: S$GLB,,, | Performed by: FAMILY MEDICINE

## 2024-02-13 PROCEDURE — 99999 PR PBB SHADOW E&M-EST. PATIENT-LVL III: CPT | Mod: PBBFAC,,, | Performed by: FAMILY MEDICINE

## 2024-02-13 PROCEDURE — 87502 INFLUENZA DNA AMP PROBE: CPT | Mod: QW,S$GLB,, | Performed by: FAMILY MEDICINE

## 2024-02-13 RX ORDER — GUAIFENESIN 1200 MG/1
TABLET, EXTENDED RELEASE ORAL
Qty: 20 TABLET | Refills: 1
Start: 2024-02-13 | End: 2024-03-12

## 2024-02-13 RX ORDER — NIRMATRELVIR AND RITONAVIR 300-100 MG
KIT ORAL
Qty: 30 TABLET | Refills: 0 | Status: SHIPPED | OUTPATIENT
Start: 2024-02-13 | End: 2024-02-18

## 2024-02-13 RX ORDER — FLUTICASONE PROPIONATE 50 MCG
1 SPRAY, SUSPENSION (ML) NASAL DAILY
Qty: 16 G | Refills: 11
Start: 2024-02-13 | End: 2024-03-12

## 2024-02-13 NOTE — PROGRESS NOTES
"Subjective:      Patient ID: Hira Riggs is a 38 y.o. male.    Chief Complaint: Fever, Nasal Congestion, Cough, Chills, and Sore Throat (Since Sunday morning 02/11/2024/)      Patient is a 38 y.o. male coming in today for URI sx. This is new pt to me. He usually f/u with Dr. Esposito, PCP.   Sx include fever for 3 days, congestion, chills, cough, and sore throat. Reports being around people last weekend that subsequently tested positive for COVID. Reports being around family since sx onset. Has been taking OTC medication for sx management. COVID swab in clinic tested positive. Discussed treatment with pt. No other health concern at this time.       No questionnaires on file.  Past Medical History:   Diagnosis Date    Frontal sinusitis 3/5/2018     Past Surgical History:   Procedure Laterality Date    COLONOSCOPY N/A 11/20/2018    Procedure: COLONOSCOPY;  Surgeon: Vinod Epperson III, MD;  Location: [facility];  Service: Endoscopy;  Laterality: N/A;    ESOPHAGOGASTRODUODENOSCOPY N/A 11/20/2018    Procedure: ESOPHAGOGASTRODUODENOSCOPY (EGD);  Surgeon: Vinod Epperson III, MD;  Location: [facility];  Service: Endoscopy;  Laterality: N/A;     Family History   Problem Relation Age of Onset    Other Other         none.    No Known Problems Mother     No Known Problems Father      Social History     Tobacco Use    Smoking status: Never    Smokeless tobacco: Never   Substance and Sexual Activity    Alcohol use: No    Drug use: No    Sexual activity: Yes       LABS:   No results found for: "HGBA1C"   Lab Results   Component Value Date    CHOL 128 02/21/2022    CHOL 147 01/26/2021    CHOL 109 (L) 05/11/2018     Lab Results   Component Value Date    LDLCALC 72.0 02/21/2022    LDLCALC 80.6 01/26/2021    LDLCALC 59.2 (L) 05/11/2018     Lab Results   Component Value Date    WBC 7.59 02/21/2022    HGB 14.5 02/21/2022    HCT 47.5 02/21/2022     02/21/2022    CHOL 128 02/21/2022    TRIG 75 02/21/2022    HDL 41 " "02/21/2022    ALT 27 02/21/2022    AST 15 02/21/2022     02/21/2022    K 4.3 02/21/2022     02/21/2022    CREATININE 0.9 02/21/2022    BUN 11 02/21/2022    CO2 24 02/21/2022    TSH 1.299 02/21/2022    PSA 0.56 01/26/2021       X-Ray Ankle Complete Left  Narrative: EXAMINATION:  XR ANKLE COMPLETE 3 VIEW LEFT    CLINICAL HISTORY:  Pain in left ankle and joints of left foot    TECHNIQUE:  AP, lateral and oblique views of the left ankle were performed.    COMPARISON:  None    FINDINGS:  Bones intact without fracture or dislocation.  Lateral soft tissue swelling.  Impression: Lateral soft tissue swelling.  No fracture seen.    Electronically signed by: Eden Hampton MD  Date:    04/03/2023  Time:    13:42        Review of Systems   Constitutional:  Positive for chills, fatigue and fever.   HENT:  Positive for congestion, ear pain, rhinorrhea and sore throat. Negative for postnasal drip.    Respiratory:  Positive for cough. Negative for shortness of breath and wheezing.    Cardiovascular:  Negative for chest pain and palpitations.     Objective:     Vitals:    02/13/24 0906   BP: (!) 128/92   BP Location: Left arm   Patient Position: Sitting   BP Method: Small (Automatic)   Pulse: 77   Temp: 97.7 °F (36.5 °C)   TempSrc: Tympanic   SpO2: 98%   Weight: 81.1 kg (178 lb 10.9 oz)   Height: 5' 5" (1.651 m)     Wt Readings from Last 10 Encounters:   02/13/24 81.1 kg (178 lb 10.9 oz)   04/17/23 73.9 kg (163 lb)   04/03/23 73.9 kg (163 lb)   07/22/22 74.3 kg (163 lb 14.4 oz)   02/17/22 78.5 kg (173 lb 1 oz)   01/25/21 78.9 kg (173 lb 15.1 oz)   10/08/19 76.5 kg (168 lb 10.4 oz)   07/29/19 78.7 kg (173 lb 8 oz)   12/19/18 77.1 kg (169 lb 15.6 oz)   11/20/18 73 kg (161 lb)     Physical Exam  Constitutional:       General: He is not in acute distress.     Appearance: Normal appearance. He is well-developed and overweight. He is not ill-appearing.   HENT:      Head: Normocephalic and atraumatic.      Right Ear: External " ear normal.      Left Ear: External ear normal.      Nose:      Right Sinus: Maxillary sinus tenderness and frontal sinus tenderness present.      Left Sinus: Maxillary sinus tenderness and frontal sinus tenderness present.   Eyes:      Conjunctiva/sclera: Conjunctivae normal.   Pulmonary:      Effort: Pulmonary effort is normal. No respiratory distress.   Skin:     Findings: No rash.   Neurological:      Mental Status: He is alert.   Psychiatric:         Attention and Perception: Attention normal. He is attentive.         Mood and Affect: Mood and affect normal. Mood is not anxious, depressed or elated. Affect is not labile, blunt, angry or inappropriate.         Speech: Speech normal. He is communicative. Speech is not rapid and pressured, delayed, slurred or tangential.         Behavior: Behavior normal. Behavior is not agitated, slowed, aggressive, withdrawn, hyperactive or combative.         Thought Content: Thought content normal.         Cognition and Memory: Cognition normal. Memory is not impaired. He does not exhibit impaired recent memory or impaired remote memory.         Judgment: Judgment normal. Judgment is not impulsive or inappropriate.       Assessment:     1. COVID-19    2. Sore throat    3. Cough, unspecified type      Plan:   Hira was seen today for fever, nasal congestion, cough, chills and sore throat.    Diagnoses and all orders for this visit:    COVID-19  -     nirmatrelvir-ritonavir (PAXLOVID) 300 mg (150 mg x 2)-100 mg copackaged tablets (EUA); Take 3 tablets by mouth 2 (two) times daily. Each dose contains 2 nirmatrelvir (pink tablets) and 1 ritonavir (white tablet). Take all 3 tablets together    Sore throat  -     POCT Influenza A/B Molecular  -     POCT COVID-19 Rapid Screening; Future  -     POCT COVID-19 Rapid Screening    Cough, unspecified type  -     POCT COVID-19 Rapid Screening; Future  -     POCT COVID-19 Rapid Screening    Other orders  -     fluticasone propionate  (FLONASE) 50 mcg/actuation nasal spray; 1 spray (50 mcg total) by Each Nostril route once daily.  -     guaiFENesin (MUCINEX) 1,200 mg Ta12; 1 tab po daily      Fever, cough, and sore throat are new problems secondary to COVID infection for which pt tested positive today in clinic. Flu  test was negative.   Instructed on quarantine and mask use around family to prevent COVID spread.  New Rx Paxlovid 900-300 mg BID for COVID treatment.   New Rx Guaifenesin 1,200 mg/day for sore throat treatment.   New Rx Flonase spray 50 mcg/day for congestion treatment.    Pt denies cough syrup today. Advised to increase fluid intake.   Instructed to f/u with PCP if sx persist or worsen.     Health Maintenance Due   Topic Date Due    COVID-19 Vaccine (1) Never done    TETANUS VACCINE  Never done    Hemoglobin A1c (Diabetic Prevention Screening)  Never done    Influenza Vaccine (1) 2023     The ASCVD Risk score (Chaim DK, et al., 2019) failed to calculate for the following reasons:    The valid total cholesterol range is 130 to 320 mg/dL  Follow up if symptoms worsen or fail to improve.    Patient Instructions   Hira Riggs,  I have sent the following medications to your preferred pharmacy on file.     Medications Ordered This Encounter   Medications    fluticasone propionate (FLONASE) 50 mcg/actuation nasal spray     Si spray (50 mcg total) by Each Nostril route once daily.     Dispense:  16 g     Refill:  11    guaiFENesin (MUCINEX) 1,200 mg Ta12     Si tab po daily     Dispense:  20 tablet     Refill:  1    nirmatrelvir-ritonavir (PAXLOVID) 300 mg (150 mg x 2)-100 mg copackaged tablets (EUA)     Sig: Take 3 tablets by mouth 2 (two) times daily. Each dose contains 2 nirmatrelvir (pink tablets) and 1 ritonavir (white tablet). Take all 3 tablets together     Dispense:  30 tablet     Refill:  0        If you would like, you can also add in the following supplements:     Zinc 50mg orally twice daily  Vitamin D3  2000 orally  IU daily  Vit C 1000mg orally twice daily.   Tylenol 500mg-1000mg every 6 hrs for fever, chills, headache      [id number] [place] - [address] [facility] [address]  [address]  [place] [address]  Phone: [phone] Fax: [phone]       There is prescription medication to reduce the duration of symptoms of COVID-19 called Paxlovid, which is an oral treatment. However it is 5 days of medication, that can be pretty tough on the stomach. It also has multiple drug interactions, and generally is not recommended for low risk patients. You can also have rebound COVID-19 symptoms once you stop this medication.  I  have sent in the Paxlovid for you to begin.      Please review your after visit summary form today's E-Visit encounter for additional patient instructions and recommendations.       Additional instructions:  Separate yourself from other people and animals in your home.  Call ahead before visiting your doctor.  Wear a facemask when around others.  Cover your coughs and sneezes.  Wash your hands often with soap and water; hand  can be used, too.  Avoid sharing personal household items.  Wipe down surfaces used daily.  Monitor your symptoms. Seek prompt medical attention if your illness is worsening (e.g., difficulty breathing).   Before seeking care, call your healthcare provider.  If you have a medical emergency and need to call 911, notify the dispatch personnel that you have, or are being evaluated for COVID-19. If possible, put on a facemask before emergency medical services arrive.    The new CDC guidelines for isolation (positive for covid) and quarantine (exposures to covid) are as follows:     If you test positive for covid:   Regardless of vaccination status, isolate at home for 5 days.   If you have no symptoms or your symptoms are resolving after 5 days, you can leave your house on day 6, but you MUST wear a mask at  all times around others for an additional 5 days (days 6-10 from + test date).   If you have any fever, you should continue to isolate at home until your fever has been resolved for at least 24 hours without the use of fever-reducing medication like acetaminophen (Tylenol), ibuprofen (Advil, Motrin), naproxen (Aleve), or aspirin.     If you were exposed to someone with covid and have had your booster shot --OR-- received your second dose of the Pfizer or Moderna vaccine within the last 6 months --OR-- received the J&J vaccine within the last two months:   Wear a mask at all times when you are around others for a full 10 days following your most recent exposure   Test 5 days after your most recent exposure if possible for COVID 19.   If you develop symptoms of covid during that time, you must quarantine at home and get tested for covid.     If you were exposed to someone with covid and are unvaccinated or overdue for your booster of the Pfizer or Moderna (6 months after second vaccine) or the J&J (2 months after your vaccine):   You should quarantine at home for 5 days, then continue to wear a mask at all times around others for an additional 5 days.   If you cannot quarantine for the initial 5 days, you must mask at all times when you are around others for a full 10 days.   Test 5 days after your most recent exposure.   If you develop symptoms of covid during that time, you must quarantine at home and get tested for covid.       Please let me know if you have further questions.      Sincerely,     Nellie Conklin MD    Scribe Attestation:   I, Johnathon Scherer, am scribing for, and in the presence of, Dr. Nellie Conklin MD. I performed the above scribed service and the documentation accurately describes the services I performed. I attest to the accuracy of the note.    I, Dr. Nellie Conklin MD, reviewed documentation as scribed above. I personally performed the services described in this documentation.  I agree  that the record reflects my personal performance and is accurate and complete. Nellie Conklin MD.    02/13/2024

## 2024-02-13 NOTE — PATIENT INSTRUCTIONS
Hira Riggs,  I have sent the following medications to your preferred pharmacy on file.     Medications Ordered This Encounter   Medications    fluticasone propionate (FLONASE) 50 mcg/actuation nasal spray     Si spray (50 mcg total) by Each Nostril route once daily.     Dispense:  16 g     Refill:  11    guaiFENesin (MUCINEX) 1,200 mg Ta12     Si tab po daily     Dispense:  20 tablet     Refill:  1    nirmatrelvir-ritonavir (PAXLOVID) 300 mg (150 mg x 2)-100 mg copackaged tablets (EUA)     Sig: Take 3 tablets by mouth 2 (two) times daily. Each dose contains 2 nirmatrelvir (pink tablets) and 1 ritonavir (white tablet). Take all 3 tablets together     Dispense:  30 tablet     Refill:  0        If you would like, you can also add in the following supplements:     Zinc 50mg orally twice daily  Vitamin D3 2000 orally  IU daily  Vit C 1000mg orally twice daily.   Tylenol 500mg-1000mg every 6 hrs for fever, chills, headache      [id number] - [address] AT [facility] [place]  [address] [facility] [address]  Phone: [phone] Fax: [phone]       There is prescription medication to reduce the duration of symptoms of COVID-19 called Paxlovid, which is an oral treatment. However it is 5 days of medication, that can be pretty tough on the stomach. It also has multiple drug interactions, and generally is not recommended for low risk patients. You can also have rebound COVID-19 symptoms once you stop this medication.  I  have sent in the Paxlovid for you to begin.      Please review your after visit summary form today's E-Visit encounter for additional patient instructions and recommendations.       Additional instructions:  Separate yourself from other people and animals in your home.  Call ahead before visiting your doctor.  Wear a facemask when around others.  Cover your coughs and sneezes.  Wash your hands often with soap and water;  hand  can be used, too.  Avoid sharing personal household items.  Wipe down surfaces used daily.  Monitor your symptoms. Seek prompt medical attention if your illness is worsening (e.g., difficulty breathing).   Before seeking care, call your healthcare provider.  If you have a medical emergency and need to call 911, notify the dispatch personnel that you have, or are being evaluated for COVID-19. If possible, put on a facemask before emergency medical services arrive.    The new CDC guidelines for isolation (positive for covid) and quarantine (exposures to covid) are as follows:     If you test positive for covid:   Regardless of vaccination status, isolate at home for 5 days.   If you have no symptoms or your symptoms are resolving after 5 days, you can leave your house on day 6, but you MUST wear a mask at all times around others for an additional 5 days (days 6-10 from + test date).   If you have any fever, you should continue to isolate at home until your fever has been resolved for at least 24 hours without the use of fever-reducing medication like acetaminophen (Tylenol), ibuprofen (Advil, Motrin), naproxen (Aleve), or aspirin.     If you were exposed to someone with covid and have had your booster shot --OR-- received your second dose of the Pfizer or Moderna vaccine within the last 6 months --OR-- received the J&J vaccine within the last two months:   Wear a mask at all times when you are around others for a full 10 days following your most recent exposure   Test 5 days after your most recent exposure if possible for COVID 19.   If you develop symptoms of covid during that time, you must quarantine at home and get tested for covid.     If you were exposed to someone with covid and are unvaccinated or overdue for your booster of the Pfizer or Moderna (6 months after second vaccine) or the J&J (2 months after your vaccine):   You should quarantine at home for 5 days, then continue to wear a mask at  all times around others for an additional 5 days.   If you cannot quarantine for the initial 5 days, you must mask at all times when you are around others for a full 10 days.   Test 5 days after your most recent exposure.   If you develop symptoms of covid during that time, you must quarantine at home and get tested for covid.       Please let me know if you have further questions.      Sincerely,     Nellie Conklin MD

## 2024-02-14 ENCOUNTER — PATIENT MESSAGE (OUTPATIENT)
Dept: RESEARCH | Facility: HOSPITAL | Age: 39
End: 2024-02-14
Payer: COMMERCIAL

## 2024-02-14 NOTE — PROGRESS NOTES
[facility]   Physical Therapy Treatment Note     Name: Hira Riggs  Clinic Number: [id number]    Therapy Diagnosis:   Encounter Diagnoses   Name Primary?    Decreased range of motion Yes    Decreased strength     Decreased functional mobility and endurance      Physician: Naian Floyd PA-C    Visit Date: 5/15/2023    Physician Orders: Physical Therapy Evaluation and Treat  Medical Diagnosis from Referral:   S93.492A (ICD-10-CM) - Sprain of anterior talofibular ligament of left ankle, initial encounter   S93.409A (ICD-10-CM) - Sprain of lateral ligament of ankle joint   Evaluation Date: 4/24/2023  Authorization Period Expiration: 04/16/2024  Plan of Care Expiration: 07/17/2023  Progress Note Due: 05/24/2023  Visit # / Visits authorized: 4/20 (+ evaluation)  FOTO: 1/3      Precautions: Standard    PTA Visit #: 0/5     Time In: 4:15 PM  Time Out: 5:00 PM  Total Billable Time: 45 minutes (Billing reflects 1 on 1 treatment time spent with patient)    SUBJECTIVE     Patient reports: he is doing well but still has not attempted jogging because he is still fearful he will injure himself. Patient states he is now able to do some of his stretches at home without pain.    He N/A compliant with home exercise program.    Response to previous treatment: good, no soreness    Functional change: not running, difficulty with full kneeling and squatting, able to sit on heels without pain     Pain: 0/10     Location: left foot    OBJECTIVE     Objective Measures updated at progress report or POC update only unless otherwise noted.       TREATMENT     Hira received the treatments listed below:     MANUAL THERAPY TECHNIQUES were applied for (0) minutes, including:    Manual Intervention Performed Today    Soft Tissue Mobilization []      Joint Mobilizations []     []     []    Functional Dry Needling  []      Plan for Next Visit: Continue as needed       THERAPEUTIC EXERCISES to develop  Pt c/o body aches, chills,headache , nausea and vomiting. Pt states she last vomited this am.  Pt states she's had some soup without vomiting.    strength, endurance, ROM, flexibility, posture, and core stabilization for (9) minutes including:    Intervention Performed Today    Upright Bike x 5 mintues   Ankle active range of motion   Red band added with all exercises today        3 x 10 dorsiflexion bilateral   3 x 10 plantarflexion bilateral   3 x 10 inversion bilateral   3 x 10 eversion bilateral    Toe flexion with towel  X 30    DORSIFLEXION lunge stretch x 10x with 10 second holds left green band                         Plan for Next Visit:        NEUROMUSCULAR RE-EDUCATION ACTIVITIES to improve Balance, Coordination, Kinesthetic, Sense, Proprioception, and Posture for (17) minutes.  The following were included:    Intervention Performed Today    Arch press up  3 x 10 sitting   Toe yoga  3 x 10 each great toe extension and 2-5 toes extension independent of one another   Gastrocnemius stretch x 2 minutes left foot only     Tandem Walking on Foam Pad x 15 reps in between parallel bars   Single Leg Stance on airex on turf throwing red ball against trampoline x X30 left LOWER EXTREMITY only          Plan for Next Visit:      THERAPEUTIC ACTIVITIES: to improve functional performance through dynamic activities, for (19)  minutes including:   x = performed today    Therapeutic Activities 5/15/2023    Single Leg Stance  Floor  Airex      3x40 second holds   BAPS Board  X20 clockwise and counter clockwise Level 2   Single Leg Squat Shuttle  4 bands, x30 left LOWER EXTREMITY only   Squats on Bosu  3x10 with UPPER EXTREMITY support   Single Leg RDL's to black mat x 2x10 bilateral   Treadmill (3 minutes each)  Walk at 3.2 mph  Jog at 4.0 mph  Walk at 3.2 mph   X  X  x Patient with no pain walking but some pain near left tibialis posterior tendon when performing light jogging    BOLD = new this visit  Plan for Next Visit: progress as tolerated       PATIENT EDUCATION AND HOME EXERCISES     Home Exercises Provided and Patient Education Provided     Education  "provided: (during session) minutes  PURPOSE: Patient educated on the impairments noted above and the effects of physical therapy intervention to improve overall condition and QOL.   EXERCISE: Patient was educated on all the above exercise prior/during/after for proper posture, positioning, and execution for safe performance with home exercise program.   STRENGTH: Patient educated on the importance of improved core and extremity strength in order to improve alignment of the spine and extremities with static positions and dynamic movement.   4/27/2023: added home exercise program to my chart and gave patient paper copy.    Written Home Exercises Provided: yes.  Exercises were reviewed and Hira was able to demonstrate them prior to the end of the session.  Hira demonstrated good  understanding of the education provided. See EMR under Patient Instructions for exercises provided during therapy sessions.    ASSESSMENT     Attempted low level jogging today and patient reported some pain/ "pulling" near left tibialis posterior tendon. Patient reported this pain was tolerable and was able to complete the 3 minutes light jog. Patient with no pain afterwards. Added single leg RDL's and patient did struggle with this, but no pain reported. Plan to continue progressing ankle strength and stability and return to jog as tolerated. No other adverse effects noted.    Hira is progressing well towards his goals.   Patient prognosis is Good.     Patient will continue to benefit from skilled outpatient physical therapy to address the deficits listed in the problem list box on initial evaluation, provide pt/family education and to maximize patient's level of independence in the home and community environment.     Patient's spiritual, cultural and educational needs considered and patient agreeable to plan of care and goals.     Anticipated Barriers for therapy: sedentary lifestyle    GOALS:  Reviewed: 05/15/2023     Short Term " Goals:  6 weeks Status  Date Met   PAIN: Patient will report worst pain of 4/10 in order to progress toward max functional ability and improve quality of life. [x] Progressing  [] Met  [] Not Met     FUNCTION: Patient will demonstrate improved function as indicated by a functional limitation score of less than or equal to 35 out of 100 on FOTO. [x] Progressing  [] Met  [] Not Met     MOBILITY: Patient will improve Range of Motion to 50% of stated goals, listed in objective measures above, in order to progress towards independence with functional activities.  [x] Progressing  [] Met  [] Not Met     STRENGTH: Patient will improve strength to 50% of stated goals, listed in objective measures above, in order to progress towards independence with functional activities.  [x] Progressing  [] Met  [] Not Met        Long Term Goals:  12 weeks Status Date Met   PAIN: Patient will report worst pain of 2/10 in order to progress toward max functional ability and improve quality of life [x] Progressing  [] Met  [] Not Met     FUNCTION: Patient will demonstrate improved function as indicated by a functional limitation score of less than or equal to 27 out of 100 on FOTO. [x] Progressing  [] Met  [] Not Met     MOBILITY: Patient will improve Range of Motion to stated goals, listed in objective measures above, in order to return to maximal functional potential and improve quality of life. [x] Progressing  [] Met  [] Not Met     STRENGTH: Patient will improve strength to stated goals, listed in objective measures above, in order to improve functional independence and quality of life. [x] Progressing  [] Met  [] Not Met     Patient will return to normal ADL's, IADL's, community involvement, recreational activities, and work-related activities with less than or equal to 2/10 pain and maximal function.  [x] Progressing  [] Met  [] Not Met           PLAN     Continue Plan of Care (POC) and progress per patient tolerance. See treatment  section for details on planned progressions next session.      Rea Manuel, PT

## 2024-03-12 ENCOUNTER — OFFICE VISIT (OUTPATIENT)
Dept: SPORTS MEDICINE | Facility: CLINIC | Age: 39
End: 2024-03-12
Payer: COMMERCIAL

## 2024-03-12 ENCOUNTER — HOSPITAL ENCOUNTER (OUTPATIENT)
Dept: RADIOLOGY | Facility: HOSPITAL | Age: 39
Discharge: HOME | End: 2024-03-12
Attending: STUDENT IN AN ORGANIZED HEALTH CARE EDUCATION/TRAINING PROGRAM
Payer: COMMERCIAL

## 2024-03-12 ENCOUNTER — PATIENT MESSAGE (OUTPATIENT)
Dept: SPORTS MEDICINE | Facility: CLINIC | Age: 39
End: 2024-03-12

## 2024-03-12 DIAGNOSIS — M79.674 CHRONIC TOE PAIN, RIGHT FOOT: Primary | ICD-10-CM

## 2024-03-12 DIAGNOSIS — M79.671 RIGHT FOOT PAIN: Primary | ICD-10-CM

## 2024-03-12 DIAGNOSIS — G89.29 CHRONIC TOE PAIN, RIGHT FOOT: Primary | ICD-10-CM

## 2024-03-12 DIAGNOSIS — M79.671 RIGHT FOOT PAIN: ICD-10-CM

## 2024-03-12 PROCEDURE — 73630 X-RAY EXAM OF FOOT: CPT | Mod: 26,RT,, | Performed by: RADIOLOGY

## 2024-03-12 PROCEDURE — 73630 X-RAY EXAM OF FOOT: CPT | Mod: TC,RT

## 2024-03-12 PROCEDURE — 1159F MED LIST DOCD IN RCRD: CPT | Mod: CPTII,S$GLB,, | Performed by: STUDENT IN AN ORGANIZED HEALTH CARE EDUCATION/TRAINING PROGRAM

## 2024-03-12 PROCEDURE — 1160F RVW MEDS BY RX/DR IN RCRD: CPT | Mod: CPTII,S$GLB,, | Performed by: STUDENT IN AN ORGANIZED HEALTH CARE EDUCATION/TRAINING PROGRAM

## 2024-03-12 PROCEDURE — 97110 THERAPEUTIC EXERCISES: CPT | Mod: GP,S$GLB,, | Performed by: STUDENT IN AN ORGANIZED HEALTH CARE EDUCATION/TRAINING PROGRAM

## 2024-03-12 PROCEDURE — 99214 OFFICE O/P EST MOD 30 MIN: CPT | Mod: S$GLB,,, | Performed by: STUDENT IN AN ORGANIZED HEALTH CARE EDUCATION/TRAINING PROGRAM

## 2024-03-12 PROCEDURE — 99999 PR PBB SHADOW E&M-EST. PATIENT-LVL II: CPT | Mod: PBBFAC,,, | Performed by: STUDENT IN AN ORGANIZED HEALTH CARE EDUCATION/TRAINING PROGRAM

## 2024-03-12 NOTE — PATIENT INSTRUCTIONS
"Assessment:  Hira Riggs is a 38 y.o. male with a chief complaint of Pain of the Right Foot    Encounter Diagnosis   Name Primary?    Chronic toe pain, right foot Yes      Plan:  XR reviewed - no abnormalities noted  Labs reviewed - paste that   Unclear etiology of pain and symptoms based on history and physical exam.  No obvious abnormalities on radiographs.  No clinical signs of neuroma or other masslike structure.  Pain seems to be localized to the tendon of the 4th toe, without any triggering or catching.  No evidence of neuropathy, weakness, atrophy, etc..  Recommend foot strengthening exercises.  See below for "toe yoga" overview.  At least 10 minutes were spent developing, teaching, and performing a home exercise program under the direction of Braxton Guy MD.  A written summary was provided and all questions were answered.   For times when pain is more consistent, can use an anti-inflammatory such as Advil or Aleve.  If not improving, can consider for MRI.    Follow-up: As needed or sooner if there are any problems between now and then.    Thank you for choosing [facility] and Dr. Braxton Guy for your orthopedic & sports medicine care. It is our goal to provide you with exceptional care that will help keep you healthy, active, and get you back in the game.    Please do not hesitate to reach out to us via email, phone, or MyChart with any questions, concerns, or feedback.    If you are experiencing pain/discomfort ,or have questions after 5pm and would like to be connected to the [facility] on-call team, please call this number and specify which Sports Medicine provider is treating you: [phone]      Toe Yoga - The purpose is to give you a smarter, stronger, and more stable foot. The big toe accounts for about 85% of our stability, get it stronger! Start with coordination:  1. Keeping little toes down, lift the big toe.  2. Put " big toe down, lift the small toes.  3. Alternate for 20 - 30 seconds before taking a break.  4. Progress from doing this sitting to standing to single leg stance.        For Foot Strength:  1. Pick the little toes up, drive the big toe down without curling it.  2. Gradually hold this squeezing for longer and longer.  3. Again progress from sitting to standing to single leg stance.  4. Start incorporating this with standing exercises like squats and deadlifts and all single leg work.     For a good primer, check out this link - toe yoga coordination - step 1    And then this one - toe yoga strength - steps 2 & 3    Or this one - Are You Ready to go Minimal: ready to go minimal video

## 2024-03-12 NOTE — PROGRESS NOTES
"      Patient ID: Hira Riggs  YOB: 1985  MRN: [id number]    Chief Complaint: Pain of the Right Foot    Referred By: Self    Occupation: Works from home      History of Present Illness: Hira Riggs is a 38 y.o. male who presents today with Pain of the Right Foot    He complains of chronic, intermittent right 4th toe pain that has bothered him since approximately 2021.  He initially sought care with Dr. Maldonado in podiatry who recommended altering his shoe wear.  He reports that he has had a consistent feeling "like a thread pulling through the toe" that occurs only when he positions his foot in a specific way - which he is unable to reproduce today in clinic.  The pain is very sharp and can sometimes last the rest of the day but typically is short lived.  He will have periods of time where it doesn't bother him at all and he is able to run and be active without any issues.  No parasthesias.      Past Medical History:   Past Medical History:   Diagnosis Date    Frontal sinusitis 3/5/2018     Past Surgical History:   Procedure Laterality Date    COLONOSCOPY N/A 11/20/2018    Procedure: COLONOSCOPY;  Surgeon: Vinod Epperson III, MD;  Location: [facility];  Service: Endoscopy;  Laterality: N/A;    ESOPHAGOGASTRODUODENOSCOPY N/A 11/20/2018    Procedure: ESOPHAGOGASTRODUODENOSCOPY (EGD);  Surgeon: Vinod Epperson III, MD;  Location: [facility];  Service: Endoscopy;  Laterality: N/A;     Family History   Problem Relation Age of Onset    Other Other         none.    No Known Problems Mother     No Known Problems Father      Social History     Socioeconomic History    Marital status:    Tobacco Use    Smoking status: Never    Smokeless tobacco: Never   Substance and Sexual Activity    Alcohol use: No    Drug use: No    Sexual activity: Yes     Medication List with Changes/Refills   Current Medications    FLUTICASONE PROPIONATE (FLONASE) 50 MCG/ACTUATION NASAL SPRAY    1 spray (50 mcg " total) by Each Nostril route once daily.    GUAIFENESIN (MUCINEX) 1,200 MG TA12    1 tab po daily     Review of patient's allergies indicates:  No Known Allergies    Physical Exam:   There is no height or weight on file to calculate BMI.    Physical Exam  Musculoskeletal:      Left foot: No deformity.       Detailed MSK exam:           Left Ankle/Foot Exam     Inspection  Deformity: absent  Erythema: absent  Bruising:  Foot - absent  Effusion:  Foot - absent  Atrophy:  Foot - absent  Scars: absent    Comments:  No particular TTP at the plantar aspect of R 4th digit.  No palpable nodules or mass at the 4th digit or into the plantar aspect of the foot  Mild symptoms as the 4th flexor tendon is palpated  Full ROM of toes  Normal neurovascular exam         Imaging:  X-Ray Foot Complete Right  Narrative: EXAM:  XR FOOT COMPLETE 3 VIEW RIGHT    INDICATIONS:  Pain in the right foot    TECHNIQUE:  3 views of the right foot    COMPARISONS:  October 2019    FINDINGS:  No fractures, no dislocations and no degenerative joint disease.  No foreign bodies and no joint effusion.  Tiny heel spur is present.  Impression:  Tiny heel spur and otherwise normal right foot, stable from previous exam    Finalized on: 3/12/2024 3:17 PM By:  Hernando French MD  [facility]# [id number]      2024-03-12 15:[id number]    [facility]    Relevant imaging results were reviewed and interpreted by me and per my read:  Normal appearing radiographs of the right foot.  Normal alignment.  No fractures or other acute abnormalities.  Stable findings from prior imaging.    This was discussed with the patient and / or family today.     Patient Instructions   Assessment:  Hira Riggs is a 38 y.o. male with a chief complaint of Pain of the Right Foot    Encounter Diagnosis   Name Primary?    Chronic toe pain, right foot Yes      Plan:  XR reviewed - no abnormalities noted  Labs reviewed - paste that   Unclear etiology of pain and symptoms based on history and physical  "exam.  No obvious abnormalities on radiographs.  No clinical signs of neuroma or other masslike structure.  Pain seems to be localized to the tendon of the 4th toe, without any triggering or catching.  No evidence of neuropathy, weakness, atrophy, etc..  Recommend foot strengthening exercises.  See below for "toe yoga" overview.  At least 10 minutes were spent developing, teaching, and performing a home exercise program under the direction of Braxton Guy MD.  A written summary was provided and all questions were answered.   For times when pain is more consistent, can use an anti-inflammatory such as Advil or Aleve.  If not improving, can consider for MRI.    Follow-up: As needed or sooner if there are any problems between now and then.    Thank you for choosing [facility] and Dr. Bratxon Guy for your orthopedic & sports medicine care. It is our goal to provide you with exceptional care that will help keep you healthy, active, and get you back in the game.    Please do not hesitate to reach out to us via email, phone, or MyChart with any questions, concerns, or feedback.    If you are experiencing pain/discomfort ,or have questions after 5pm and would like to be connected to the [facility] on-call team, please call this number and specify which Sports Medicine provider is treating you: [phone]      Toe Yoga - The purpose is to give you a smarter, stronger, and more stable foot. The big toe accounts for about 85% of our stability, get it stronger! Start with coordination:  1. Keeping little toes down, lift the big toe.  2. Put big toe down, lift the small toes.  3. Alternate for 20 - 30 seconds before taking a break.  4. Progress from doing this sitting to standing to single leg stance.        For Foot Strength:  1. Pick the little toes up, drive the big toe down without curling it.  2. Gradually hold this squeezing for longer and longer.  3. " Again progress from sitting to standing to single leg stance.  4. Start incorporating this with standing exercises like squats and deadlifts and all single leg work.     For a good primer, check out this link - toe yoga coordination - step 1    And then this one - toe yoga strength - steps 2 & 3    Or this one - Are You Ready to go Minimal: ready to go minimal video      A copy of today's visit note has been sent to the referring provider.           Braxton Guy MD  Primary Care Sports Medicine    Disclaimer: This note was prepared using a voice recognition system and is likely to have sound alike errors within the text.

## 2024-03-13 ENCOUNTER — TELEPHONE (OUTPATIENT)
Dept: INTERNAL MEDICINE | Facility: CLINIC | Age: 39
End: 2024-03-13
Payer: COMMERCIAL

## 2024-03-13 DIAGNOSIS — Z00.00 ROUTINE GENERAL MEDICAL EXAMINATION AT A HEALTH CARE FACILITY: Primary | ICD-10-CM

## 2024-03-13 NOTE — TELEPHONE ENCOUNTER
----- Message from Jean Cabral sent at 3/13/2024 12:44 PM CDT -----  Regarding: pt callback  Name of Who is Calling:Pt         What is the request in detail: Requesting labs for appt           Can the clinic reply by [facility]: yes         What Number to [facility] Back [facility]:Telephone Information:  Mobile          [phone]

## 2024-03-15 ENCOUNTER — LAB VISIT (OUTPATIENT)
Dept: LAB | Facility: HOSPITAL | Age: 39
End: 2024-03-15
Attending: INTERNAL MEDICINE
Payer: COMMERCIAL

## 2024-03-15 DIAGNOSIS — Z00.00 ROUTINE GENERAL MEDICAL EXAMINATION AT A HEALTH CARE FACILITY: ICD-10-CM

## 2024-03-15 LAB
ALBUMIN SERPL BCP-MCNC: 4.4 G/DL (ref 3.5–5.2)
ALP SERPL-CCNC: 77 U/L (ref 55–135)
ALT SERPL W/O P-5'-P-CCNC: 43 U/L (ref 10–44)
ANION GAP SERPL CALC-SCNC: 6 MMOL/L (ref 8–16)
AST SERPL-CCNC: 17 U/L (ref 10–40)
BASOPHILS # BLD AUTO: 0.04 K/UL (ref 0–0.2)
BASOPHILS NFR BLD: 0.5 % (ref 0–1.9)
BILIRUB SERPL-MCNC: 0.5 MG/DL (ref 0.1–1)
BUN SERPL-MCNC: 9 MG/DL (ref 6–20)
CALCIUM SERPL-MCNC: 10 MG/DL (ref 8.7–10.5)
CHLORIDE SERPL-SCNC: 108 MMOL/L (ref 95–110)
CHOLEST SERPL-MCNC: 132 MG/DL (ref 120–199)
CHOLEST/HDLC SERPL: 3 {RATIO} (ref 2–5)
CO2 SERPL-SCNC: 26 MMOL/L (ref 23–29)
COMPLEXED PSA SERPL-MCNC: 0.67 NG/ML (ref 0–4)
CREAT SERPL-MCNC: 0.8 MG/DL (ref 0.5–1.4)
DIFFERENTIAL METHOD BLD: ABNORMAL
EOSINOPHIL # BLD AUTO: 0.3 K/UL (ref 0–0.5)
EOSINOPHIL NFR BLD: 4.1 % (ref 0–8)
ERYTHROCYTE [DISTWIDTH] IN BLOOD BY AUTOMATED COUNT: 18.1 % (ref 11.5–14.5)
EST. GFR  (NO RACE VARIABLE): >60 ML/MIN/1.73 M^2
ESTIMATED AVG GLUCOSE: 120 MG/DL (ref 68–131)
GLUCOSE SERPL-MCNC: 90 MG/DL (ref 70–110)
HBA1C MFR BLD: 5.8 % (ref 4–5.6)
HCT VFR BLD AUTO: 49.9 % (ref 40–54)
HDLC SERPL-MCNC: 44 MG/DL (ref 40–75)
HDLC SERPL: 33.3 % (ref 20–50)
HGB BLD-MCNC: 15.3 G/DL (ref 14–18)
IMM GRANULOCYTES # BLD AUTO: 0.03 K/UL (ref 0–0.04)
IMM GRANULOCYTES NFR BLD AUTO: 0.4 % (ref 0–0.5)
LDLC SERPL CALC-MCNC: 72.6 MG/DL (ref 63–159)
LYMPHOCYTES # BLD AUTO: 2.4 K/UL (ref 1–4.8)
LYMPHOCYTES NFR BLD: 31 % (ref 18–48)
MCH RBC QN AUTO: 22.8 PG (ref 27–31)
MCHC RBC AUTO-ENTMCNC: 30.7 G/DL (ref 32–36)
MCV RBC AUTO: 74 FL (ref 82–98)
MONOCYTES # BLD AUTO: 0.7 K/UL (ref 0.3–1)
MONOCYTES NFR BLD: 8.3 % (ref 4–15)
NEUTROPHILS # BLD AUTO: 4.4 K/UL (ref 1.8–7.7)
NEUTROPHILS NFR BLD: 55.7 % (ref 38–73)
NONHDLC SERPL-MCNC: 88 MG/DL
NRBC BLD-RTO: 0 /100 WBC
PLATELET # BLD AUTO: 223 K/UL (ref 150–450)
PMV BLD AUTO: 12.3 FL (ref 9.2–12.9)
POTASSIUM SERPL-SCNC: 5.3 MMOL/L (ref 3.5–5.1)
PROT SERPL-MCNC: 7.8 G/DL (ref 6–8.4)
RBC # BLD AUTO: 6.71 M/UL (ref 4.6–6.2)
SODIUM SERPL-SCNC: 140 MMOL/L (ref 136–145)
TRIGL SERPL-MCNC: 77 MG/DL (ref 30–150)
TSH SERPL DL<=0.005 MIU/L-ACNC: 2.22 UIU/ML (ref 0.4–4)
WBC # BLD AUTO: 7.84 K/UL (ref 3.9–12.7)

## 2024-03-15 PROCEDURE — 80061 LIPID PANEL: CPT | Performed by: INTERNAL MEDICINE

## 2024-03-15 PROCEDURE — 85025 COMPLETE CBC W/AUTO DIFF WBC: CPT | Performed by: INTERNAL MEDICINE

## 2024-03-15 PROCEDURE — 83036 HEMOGLOBIN GLYCOSYLATED A1C: CPT | Performed by: INTERNAL MEDICINE

## 2024-03-15 PROCEDURE — 84153 ASSAY OF PSA TOTAL: CPT | Performed by: INTERNAL MEDICINE

## 2024-03-15 PROCEDURE — 84443 ASSAY THYROID STIM HORMONE: CPT | Performed by: INTERNAL MEDICINE

## 2024-03-15 PROCEDURE — 36415 COLL VENOUS BLD VENIPUNCTURE: CPT | Performed by: INTERNAL MEDICINE

## 2024-03-15 PROCEDURE — 80053 COMPREHEN METABOLIC PANEL: CPT | Performed by: INTERNAL MEDICINE

## 2024-03-18 ENCOUNTER — OFFICE VISIT (OUTPATIENT)
Dept: INTERNAL MEDICINE | Facility: CLINIC | Age: 39
End: 2024-03-18
Payer: COMMERCIAL

## 2024-03-18 VITALS
DIASTOLIC BLOOD PRESSURE: 70 MMHG | TEMPERATURE: 97 F | BODY MASS INDEX: 29.82 KG/M2 | HEIGHT: 65 IN | OXYGEN SATURATION: 95 % | HEART RATE: 81 BPM | WEIGHT: 179 LBS | SYSTOLIC BLOOD PRESSURE: 120 MMHG

## 2024-03-18 DIAGNOSIS — E66.3 OVERWEIGHT: ICD-10-CM

## 2024-03-18 DIAGNOSIS — Z00.00 ROUTINE GENERAL MEDICAL EXAMINATION AT A HEALTH CARE FACILITY: Primary | ICD-10-CM

## 2024-03-18 DIAGNOSIS — R73.03 PREDIABETES: ICD-10-CM

## 2024-03-18 PROCEDURE — 3074F SYST BP LT 130 MM HG: CPT | Mod: CPTII,S$GLB,, | Performed by: INTERNAL MEDICINE

## 2024-03-18 PROCEDURE — 1159F MED LIST DOCD IN RCRD: CPT | Mod: CPTII,S$GLB,, | Performed by: INTERNAL MEDICINE

## 2024-03-18 PROCEDURE — 99999 PR PBB SHADOW E&M-EST. PATIENT-LVL IV: CPT | Mod: PBBFAC,,, | Performed by: INTERNAL MEDICINE

## 2024-03-18 PROCEDURE — 99396 PREV VISIT EST AGE 40-64: CPT | Mod: S$GLB,,, | Performed by: INTERNAL MEDICINE

## 2024-03-18 PROCEDURE — 3078F DIAST BP <80 MM HG: CPT | Mod: CPTII,S$GLB,, | Performed by: INTERNAL MEDICINE

## 2024-03-18 PROCEDURE — 3044F HG A1C LEVEL LT 7.0%: CPT | Mod: CPTII,S$GLB,, | Performed by: INTERNAL MEDICINE

## 2024-03-18 PROCEDURE — 3008F BODY MASS INDEX DOCD: CPT | Mod: CPTII,S$GLB,, | Performed by: INTERNAL MEDICINE

## 2024-03-18 NOTE — PROGRESS NOTES
"No sorry Subjective:      Patient ID: Hira Riggs is a 38 y.o. male.    Chief Complaint: Annual Exam    HPI      38 y.o. with  Patient Active Problem List   Diagnosis    Routine general medical examination at a health care facility    Microcytic anemia    Vitamin D deficiency    Iron deficiency anemia due to chronic blood loss    Prediabetes     Past Medical History:   Diagnosis Date    Frontal sinusitis 3/5/2018       Here today for annual prev exam.  Compliant with meds without significant side effects. Energy and appetite are good.         Past Surgical History:   Procedure Laterality Date    COLONOSCOPY N/A 11/20/2018    Procedure: COLONOSCOPY;  Surgeon: Vinod Epperson III, MD;  Location: [facility];  Service: Endoscopy;  Laterality: N/A;    ESOPHAGOGASTRODUODENOSCOPY N/A 11/20/2018    Procedure: ESOPHAGOGASTRODUODENOSCOPY (EGD);  Surgeon: Vinod Epperson III, MD;  Location: [facility];  Service: Endoscopy;  Laterality: N/A;     Social History     Socioeconomic History    Marital status:    Tobacco Use    Smoking status: Never    Smokeless tobacco: Never   Substance and Sexual Activity    Alcohol use: No    Drug use: No    Sexual activity: Yes     family history includes No Known Problems in his father and mother; Other in an other family member.  Review of Systems   Constitutional:  Negative for chills and fever.   HENT:  Negative for ear pain and sore throat.    Respiratory:  Negative for cough.    Cardiovascular:  Negative for chest pain.   Gastrointestinal:  Negative for abdominal pain and blood in stool.   Genitourinary:  Negative for dysuria and hematuria.   Neurological:  Negative for seizures and syncope.     Objective:   /70 (BP Location: Right arm, Patient Position: Sitting, BP Method: Large (Manual))   Pulse 81   Temp 97.2 °F (36.2 °C) (Tympanic)   Ht 5' 5" (1.651 m)   Wt 81.2 kg (179 lb 0.2 oz)   SpO2 95%   BMI 29.79 kg/m²     Physical Exam  Constitutional:       General: " He is not in acute distress.     Appearance: He is well-developed.   HENT:      Head: Normocephalic and atraumatic.   Eyes:      Extraocular Movements: Extraocular movements intact.   Neck:      Thyroid: No thyromegaly.   Cardiovascular:      Rate and Rhythm: Normal rate and regular rhythm.   Pulmonary:      Breath sounds: Normal breath sounds. No wheezing or rales.   Abdominal:      General: Bowel sounds are normal.      Palpations: Abdomen is soft.      Tenderness: There is no abdominal tenderness.   Musculoskeletal:         General: No swelling.      Cervical back: Neck supple. No rigidity.   Lymphadenopathy:      Cervical: No cervical adenopathy.   Skin:     General: Skin is warm and dry.   Neurological:      Mental Status: He is alert and oriented to person, place, and time.   Psychiatric:         Behavior: Behavior normal.         Lab Results   Component Value Date    WBC 7.84 03/15/2024    HGB 15.3 03/15/2024    HGB 14.5 02/21/2022    HGB 15.0 01/26/2021    HCT 49.9 03/15/2024    MCV 74 (L) 03/15/2024    MCV 73 (L) 02/21/2022    MCV 74 (L) 01/26/2021     03/15/2024    CHOL 132 03/15/2024    TRIG 77 03/15/2024    HDL 44 03/15/2024    LDLCALC 72.6 03/15/2024    LDLCALC 72.0 02/21/2022    LDLCALC 80.6 01/26/2021    ALT 43 03/15/2024    AST 17 03/15/2024     03/15/2024    K 5.3 (H) 03/15/2024    CALCIUM 10.0 03/15/2024     03/15/2024    CO2 26 03/15/2024    BUN 9 03/15/2024    CREATININE 0.8 03/15/2024    CREATININE 0.9 02/21/2022    CREATININE 0.9 01/26/2021    EGFRNORACEVR >60.0 03/15/2024    TSH 2.217 03/15/2024    TSH 1.299 02/21/2022    TSH 2.105 01/26/2021    PSA 0.67 03/15/2024    PSA 0.56 01/26/2021    GLU 90 03/15/2024    HGBA1C 5.8 (H) 03/15/2024    [id number] 18 (L) 09/14/2018          The 10-year ASCVD risk score (Chaim DYE, et al., 2019) is: 0.5%    Values used to calculate the score:      Age: 38 years      Sex: Male      Is Non- : No      Diabetic: No       Tobacco smoker: No      Systolic Blood Pressure: 120 mmHg      Is BP treated: No      HDL Cholesterol: 44 mg/dL      Total Cholesterol: 132 mg/dL     Assessment:     1. Routine general medical examination at a health care facility    2. Overweight    3. Prediabetes      Plan:   1. Routine general medical examination at a health care facility  Overview:  Heart healthy diet and regular exercise.  Health maintenance reviewed patient    Orders:  -     Comprehensive Metabolic Panel; Future; Expected date: 03/18/2025  -     CBC Auto Differential; Future; Expected date: 03/18/2025  -     TSH; Future; Expected date: 03/18/2025  -     Lipid Panel; Future; Expected date: 03/18/2025  -     Hemoglobin A1C; Future; Expected date: 03/18/2025  -     PSA, Screening; Future; Expected date: 03/18/2025  -     Cancel: Hemoglobin A1C; Future; Expected date: 03/18/2025    2. Overweight  -     Ambulatory referral/consult to [facility]; Future; Expected date: 03/25/2024    3. Prediabetes  Overview:  Diabetic diet and exercise.     Assessment & Plan:  Patient requests follow-up regarding prediabetes in 3 months.    Orders:  -     Ambulatory referral/consult to [facility]; Future; Expected date: 03/25/2024  -     Hemoglobin A1C; Future; Expected date: 06/18/2024        There are no Patient Instructions on file for this visit.    Future Appointments   Date Time Provider Department Center   3/11/2025  7:45 AM LABORATORY, [facility] [place]   3/18/2025 10:00 AM Osvaldo Esposito MD [facility] [place]             Follow up in about 3 months (around 6/18/2024), or if symptoms worsen or fail to improve, for Virtual Visit ok for f/u.

## 2024-06-17 PROBLEM — Z00.00 ROUTINE GENERAL MEDICAL EXAMINATION AT A HEALTH CARE FACILITY: Status: RESOLVED | Noted: 2017-05-12 | Resolved: 2024-06-17

## 2024-08-26 NOTE — PROGRESS NOTES
Operative Note       Surgery Date: 8/26/2024     Surgeons and Role:     * Margoth Monique MD - Primary     * Willard Rose MD - Resident - Assisting    Pre-op Diagnosis:  Recurrent acute suppurative otitis media without spontaneous rupture of tympanic membrane of both sides [H66.006]  Failed hearing screening [R94.120]  Dysfunction of both eustachian tubes [H69.93]    Post-op Diagnosis:  Post-Op Diagnosis Codes:     * Recurrent acute suppurative otitis media without spontaneous rupture of tympanic membrane of both sides [H66.006]     * Failed hearing screening [R94.120]     * Dysfunction of both eustachian tubes [H69.93]  Procedure(s) (LRB):  MYRINGOTOMY, WITH TYMPANOSTOMY TUBE INSERTION (Bilateral)    Anesthesia: General    Procedure in Detail/Findings:  FINDINGS AT THE TIME OF SURGERY:                                             1.  Right ear:    dry                                          2.  Left ear:      dry                                 PROCEDURE IN DETAIL:  After successful induction of general mask anesthesia, the ears were examined with the microscope.  Alcohol and suction were used to clean the ears bilaterally.  Anterior inferior myringotomies were made bilaterally and ingram PE tubes were inserted. The ears were irrigated with saline bilaterally.  The child was awakened and transported to the Recovery Room in good condition.  There were no complications.     Estimated Blood Loss: 0 ml           Specimens (From admission, onward)      None          Implants:   Implant Name Type Inv. Item Serial No.  Lot No. LRB No. Used Action   GROMMET MOD ARMSTR 1.14MM - [id number]  GROMMET MOD ARMSTR 1.14MM   [id number] Bilateral 1 Implanted     Drains: none           Disposition: PACU - hemodynamically stable.           Condition: Good    Attestation:  I was present and scrubbed for the entire procedure.                   Subjective:     Patient ID: Hira Riggs is a 33 y.o. male.    Chief Complaint: Foot Pain (Pt c/o of pain in R. foot 3rd toe, poking in bilertial foot, rates pain in to 8/10 bottom of foot a 3/10,wears tennis with socks, non diabetic pt, PCP Dr Quintero)    Hira is a 33 y.o. male who presents to the podiatry clinic  with complaint of  right 3rd toe pain. Onset of the symptoms was about a month ago. Precipitating event: none known. Current symptoms include: pain only when stading barefoot a certain way. Aggravating factors: standing. Symptoms have gradually worsened. Patient has had no prior foot problems. Evaluation to date: none. Treatment to date: none. Patients rates pain 8/10 on pain scale.        Patient Active Problem List   Diagnosis    Frontal sinusitis    Microcytic anemia    Vitamin D deficiency    Iron deficiency anemia due to chronic blood loss       Medication List with Changes/Refills   Current Medications    METRONIDAZOLE (FLAGYL) 500 MG TABLET    Take 1 tablet (500 mg total) by mouth every 8 (eight) hours.    PANTOPRAZOLE (PROTONIX) 40 MG TABLET    Take 1 tablet (40 mg total) by mouth once daily.    TETRACYCLINE (ACHROMYCIN,SUMYCIN) 500 MG CAPSULE    Take 1 capsule (500 mg total) by mouth 2 (two) times daily.       Review of patient's allergies indicates:  No Known Allergies    Past Surgical History:   Procedure Laterality Date    COLONOSCOPY N/A 11/20/2018    Procedure: COLONOSCOPY;  Surgeon: Vinod Epperson III, MD;  Location: [facility];  Service: Endoscopy;  Laterality: N/A;    ESOPHAGOGASTRODUODENOSCOPY N/A 11/20/2018    Procedure: ESOPHAGOGASTRODUODENOSCOPY (EGD);  Surgeon: Vinod Epperson III, MD;  Location: [facility];  Service: Endoscopy;  Laterality: N/A;       Family History   Problem Relation Age of Onset    Other Other         none.       Social History     Socioeconomic History    Marital status:      Spouse name: Not on file    Number of children: Not on file  "   Years of education: Not on file    Highest education level: Not on file   Occupational History    Not on file   Social Needs    Financial resource strain: Not on file    Food insecurity:     Worry: Not on file     Inability: Not on file    Transportation needs:     Medical: Not on file     Non-medical: Not on file   Tobacco Use    Smoking status: Never Smoker    Smokeless tobacco: Never Used   Substance and Sexual Activity    Alcohol use: No    Drug use: No    Sexual activity: Not on file   Lifestyle    Physical activity:     Days per week: Not on file     Minutes per session: Not on file    Stress: Not on file   Relationships    Social connections:     Talks on phone: Not on file     Gets together: Not on file     Attends Jehovah's witness service: Not on file     Active member of club or organization: Not on file     Attends meetings of clubs or organizations: Not on file     Relationship status: Not on file   Other Topics Concern    Not on file   Social History Narrative    Not on file       Vitals:    10/08/19 1321   BP: 117/80   Pulse: 74   Weight: 76.5 kg (168 lb 10.4 oz)   Height: 5' 5" (1.651 m)   PainSc: 0-No pain        Review of Systems   Constitutional: Negative for chills and fever.   Respiratory: Negative for shortness of breath.    Cardiovascular: Negative for chest pain, palpitations, orthopnea, claudication and leg swelling.   Gastrointestinal: Negative for diarrhea, nausea and vomiting.   Musculoskeletal: Negative for joint pain.   Skin: Negative for rash.   Neurological: Negative for dizziness, tingling, sensory change, focal weakness and weakness.   Psychiatric/Behavioral: Negative.              Objective:       PHYSICAL EXAM: Apperance: Alert and orient in no distress,well developed, and with good attention to grooming and body habits  Patient presents ambulating in dress shoes.   Lower Extremity Physical Exam:  VASCULAR: Dorsalis pedis pulses 2/4 bilateral and Posterior Tibial pulses " 2/4 bilateral. Capillary fill time <4 seconds bilateral. No edema observed bilateral. Varicosities absent bilateral. Skin temperature of the lower extremities is warm to warm, proximal to distal. Hair growth WNL bilateral.  DERMATOLOGICAL: No skin rashes, subcutaneous nodules, lesions, or ulcers observed bilateral.  NEUROLOGICAL: Light touch, sharp-dull, proprioception all present and equal bilaterally.    MUSCULOSKELETAL: Muscle strength is 5/5 for foot inverters, everters, plantarflexors, and dorsiflexors. Muscle tone is normal. (--) pain on palpation or ROM of right 3rd toe. Pes planus foot noted bilateral.       TEST RESULTS: Radiographs of right foot/ankle taken reveals minimal early dorsal and plantar calcaneal spurs.  Pes planus.  No acute or healing fracture or dislocation.  No significant soft tissue swelling, calcification or foreign body.          Assessment:       Encounter Diagnoses   Name Primary?    Toe inflammation - Right Foot Yes    Pain of toe of right foot - Right Foot          Plan:   Toe inflammation - Right Foot    Pain of toe of right foot - Right Foot  -     X-Ray Foot Complete Right; Future; Expected date: 10/08/2019      I counseled the patient on his conditions, regarding findings of my examination, my impressions, and usual treatment plan.   Ordered and reviewed right foot x-rays in exam room with patient.   The patient and I reviewed the types of shoes he should be wearing, my recommendation includes generally the best time of the day for a shoe fitting is the afternoon, shoes with a wide toe box, very good cushion, and tennis shoes with removable inner soles.The patient and I reviewed my recommendations for over-the-counter orthotic inserts.   Patient instructed on adequate icing techniques. Patient should ice the affected area at least once per day x 10 minutes for 10 days . I advised the  patient that extra icing would also be beneficial to ensure adequate anti inflammatory effect.    I gave written and verbal instructions on heel cord stretching and this was demonstrated for the patient. Patient expressed understanding.  Patient to return if symptoms persist or worsen.               David Maldonado DPM  [facility]

## 2024-11-05 ENCOUNTER — PATIENT MESSAGE (OUTPATIENT)
Dept: RESEARCH | Facility: HOSPITAL | Age: 39
End: 2024-11-05
Payer: COMMERCIAL

## 2025-03-11 ENCOUNTER — LAB VISIT (OUTPATIENT)
Dept: LAB | Facility: HOSPITAL | Age: 40
End: 2025-03-11
Attending: INTERNAL MEDICINE
Payer: COMMERCIAL

## 2025-03-11 DIAGNOSIS — Z00.00 ROUTINE GENERAL MEDICAL EXAMINATION AT A HEALTH CARE FACILITY: ICD-10-CM

## 2025-03-11 LAB
ALBUMIN SERPL BCP-MCNC: 4.4 G/DL (ref 3.5–5.2)
ALP SERPL-CCNC: 75 U/L (ref 40–150)
ALT SERPL W/O P-5'-P-CCNC: 31 U/L (ref 10–44)
ANION GAP SERPL CALC-SCNC: 10 MMOL/L (ref 8–16)
AST SERPL-CCNC: 21 U/L (ref 10–40)
BASOPHILS # BLD AUTO: 0.05 K/UL (ref 0–0.2)
BASOPHILS NFR BLD: 0.6 % (ref 0–1.9)
BILIRUB SERPL-MCNC: 0.7 MG/DL (ref 0.1–1)
BUN SERPL-MCNC: 10 MG/DL (ref 6–20)
CALCIUM SERPL-MCNC: 9.4 MG/DL (ref 8.7–10.5)
CHLORIDE SERPL-SCNC: 104 MMOL/L (ref 95–110)
CHOLEST SERPL-MCNC: 154 MG/DL (ref 120–199)
CHOLEST/HDLC SERPL: 3.7 {RATIO} (ref 2–5)
CO2 SERPL-SCNC: 24 MMOL/L (ref 23–29)
COMPLEXED PSA SERPL-MCNC: 0.86 NG/ML (ref 0–4)
CREAT SERPL-MCNC: 0.8 MG/DL (ref 0.5–1.4)
DIFFERENTIAL METHOD BLD: ABNORMAL
EOSINOPHIL # BLD AUTO: 0.2 K/UL (ref 0–0.5)
EOSINOPHIL NFR BLD: 2.2 % (ref 0–8)
ERYTHROCYTE [DISTWIDTH] IN BLOOD BY AUTOMATED COUNT: 17.7 % (ref 11.5–14.5)
EST. GFR  (NO RACE VARIABLE): >60 ML/MIN/1.73 M^2
ESTIMATED AVG GLUCOSE: 123 MG/DL (ref 68–131)
GLUCOSE SERPL-MCNC: 88 MG/DL (ref 70–110)
HBA1C MFR BLD: 5.9 % (ref 4–5.6)
HCT VFR BLD AUTO: 48.2 % (ref 40–54)
HDLC SERPL-MCNC: 42 MG/DL (ref 40–75)
HDLC SERPL: 27.3 % (ref 20–50)
HGB BLD-MCNC: 14.7 G/DL (ref 14–18)
IMM GRANULOCYTES # BLD AUTO: 0.02 K/UL (ref 0–0.04)
IMM GRANULOCYTES NFR BLD AUTO: 0.2 % (ref 0–0.5)
LDLC SERPL CALC-MCNC: 96.2 MG/DL (ref 63–159)
LYMPHOCYTES # BLD AUTO: 2.4 K/UL (ref 1–4.8)
LYMPHOCYTES NFR BLD: 30.1 % (ref 18–48)
MCH RBC QN AUTO: 22.2 PG (ref 27–31)
MCHC RBC AUTO-ENTMCNC: 30.5 G/DL (ref 32–36)
MCV RBC AUTO: 73 FL (ref 82–98)
MONOCYTES # BLD AUTO: 0.7 K/UL (ref 0.3–1)
MONOCYTES NFR BLD: 8.3 % (ref 4–15)
NEUTROPHILS # BLD AUTO: 4.7 K/UL (ref 1.8–7.7)
NEUTROPHILS NFR BLD: 58.6 % (ref 38–73)
NONHDLC SERPL-MCNC: 112 MG/DL
NRBC BLD-RTO: 0 /100 WBC
PLATELET # BLD AUTO: 211 K/UL (ref 150–450)
PMV BLD AUTO: 12.7 FL (ref 9.2–12.9)
POTASSIUM SERPL-SCNC: 4.2 MMOL/L (ref 3.5–5.1)
PROT SERPL-MCNC: 8 G/DL (ref 6–8.4)
RBC # BLD AUTO: 6.62 M/UL (ref 4.6–6.2)
SODIUM SERPL-SCNC: 138 MMOL/L (ref 136–145)
TRIGL SERPL-MCNC: 79 MG/DL (ref 30–150)
TSH SERPL DL<=0.005 MIU/L-ACNC: 2.43 UIU/ML (ref 0.4–4)
WBC # BLD AUTO: 8.1 K/UL (ref 3.9–12.7)

## 2025-03-11 PROCEDURE — 80053 COMPREHEN METABOLIC PANEL: CPT | Performed by: INTERNAL MEDICINE

## 2025-03-11 PROCEDURE — 85025 COMPLETE CBC W/AUTO DIFF WBC: CPT | Performed by: INTERNAL MEDICINE

## 2025-03-11 PROCEDURE — 84443 ASSAY THYROID STIM HORMONE: CPT | Performed by: INTERNAL MEDICINE

## 2025-03-11 PROCEDURE — 84153 ASSAY OF PSA TOTAL: CPT | Performed by: INTERNAL MEDICINE

## 2025-03-11 PROCEDURE — 80061 LIPID PANEL: CPT | Performed by: INTERNAL MEDICINE

## 2025-03-11 PROCEDURE — 36415 COLL VENOUS BLD VENIPUNCTURE: CPT | Performed by: INTERNAL MEDICINE

## 2025-03-11 PROCEDURE — 83036 HEMOGLOBIN GLYCOSYLATED A1C: CPT | Performed by: INTERNAL MEDICINE

## 2025-03-12 ENCOUNTER — RESULTS FOLLOW-UP (OUTPATIENT)
Dept: INTERNAL MEDICINE | Facility: CLINIC | Age: 40
End: 2025-03-12

## 2025-03-18 ENCOUNTER — OFFICE VISIT (OUTPATIENT)
Dept: INTERNAL MEDICINE | Facility: CLINIC | Age: 40
End: 2025-03-18
Payer: COMMERCIAL

## 2025-03-18 VITALS
SYSTOLIC BLOOD PRESSURE: 136 MMHG | WEIGHT: 174.19 LBS | HEIGHT: 65 IN | TEMPERATURE: 97 F | DIASTOLIC BLOOD PRESSURE: 88 MMHG | OXYGEN SATURATION: 98 % | HEART RATE: 62 BPM | BODY MASS INDEX: 29.02 KG/M2

## 2025-03-18 DIAGNOSIS — R73.03 PREDIABETES: ICD-10-CM

## 2025-03-18 DIAGNOSIS — Z00.00 ROUTINE GENERAL MEDICAL EXAMINATION AT A HEALTH CARE FACILITY: Primary | ICD-10-CM

## 2025-03-18 DIAGNOSIS — E55.9 VITAMIN D DEFICIENCY: ICD-10-CM

## 2025-03-18 DIAGNOSIS — D50.0 IRON DEFICIENCY ANEMIA DUE TO CHRONIC BLOOD LOSS: ICD-10-CM

## 2025-03-18 DIAGNOSIS — E66.3 OVERWEIGHT (BMI 25.0-29.9): ICD-10-CM

## 2025-03-18 PROCEDURE — 3075F SYST BP GE 130 - 139MM HG: CPT | Mod: CPTII,S$GLB,, | Performed by: INTERNAL MEDICINE

## 2025-03-18 PROCEDURE — 1159F MED LIST DOCD IN RCRD: CPT | Mod: CPTII,S$GLB,, | Performed by: INTERNAL MEDICINE

## 2025-03-18 PROCEDURE — 3044F HG A1C LEVEL LT 7.0%: CPT | Mod: CPTII,S$GLB,, | Performed by: INTERNAL MEDICINE

## 2025-03-18 PROCEDURE — 3008F BODY MASS INDEX DOCD: CPT | Mod: CPTII,S$GLB,, | Performed by: INTERNAL MEDICINE

## 2025-03-18 PROCEDURE — 99999 PR PBB SHADOW E&M-EST. PATIENT-LVL III: CPT | Mod: PBBFAC,,, | Performed by: INTERNAL MEDICINE

## 2025-03-18 PROCEDURE — 99396 PREV VISIT EST AGE 40-64: CPT | Mod: S$GLB,,, | Performed by: INTERNAL MEDICINE

## 2025-03-18 PROCEDURE — 3079F DIAST BP 80-89 MM HG: CPT | Mod: CPTII,S$GLB,, | Performed by: INTERNAL MEDICINE

## 2025-03-18 NOTE — PROGRESS NOTES
"Subjective:      Patient ID: Hira Riggs is a 39 y.o. male.    Chief Complaint: Follow-up    Follow-up  Pertinent negatives include no abdominal pain, chest pain, chills, coughing, fever or sore throat.     History of Present Illness               39 y.o. with  Problem List[1]  Past Medical History:   Diagnosis Date    Frontal sinusitis 3/5/2018       Here today for annual prev exam.  Compliant with meds without significant side effects. Energy and appetite are good.     Declined vaccines.     Past Surgical History:   Procedure Laterality Date    COLONOSCOPY N/A 11/20/2018    Procedure: COLONOSCOPY;  Surgeon: Vinod Epperson III, MD;  Location: [facility];  Service: Endoscopy;  Laterality: N/A;    ESOPHAGOGASTRODUODENOSCOPY N/A 11/20/2018    Procedure: ESOPHAGOGASTRODUODENOSCOPY (EGD);  Surgeon: Vinod Epperson III, MD;  Location: [facility];  Service: Endoscopy;  Laterality: N/A;     Social History[2]  family history includes No Known Problems in his father and mother; Other in an other family member.  Review of Systems   Constitutional:  Negative for chills and fever.   HENT:  Negative for ear pain and sore throat.    Respiratory:  Negative for cough.    Cardiovascular:  Negative for chest pain.   Gastrointestinal:  Negative for abdominal pain and blood in stool.   Genitourinary:  Negative for dysuria and hematuria.   Neurological:  Negative for seizures and syncope.     Objective:   /88 (BP Location: Right arm, Patient Position: Sitting)   Pulse 62   Temp 96.6 °F (35.9 °C)   Ht 5' 5" (1.651 m)   Wt 79 kg (174 lb 2.6 oz)   SpO2 98%   BMI 28.98 kg/m²     Physical Exam  Constitutional:       General: He is not in acute distress.     Appearance: He is well-developed.   HENT:      Head: Normocephalic and atraumatic.      Right Ear: External ear normal.      Left Ear: External ear normal.   Eyes:      Pupils: Pupils are equal, round, and reactive to light.   Neck:      Thyroid: No thyromegaly. "   Cardiovascular:      Rate and Rhythm: Normal rate and regular rhythm.   Pulmonary:      Breath sounds: Normal breath sounds. No wheezing or rales.   Abdominal:      General: Bowel sounds are normal.      Palpations: Abdomen is soft.      Tenderness: There is no abdominal tenderness.   Musculoskeletal:      Cervical back: Neck supple.   Lymphadenopathy:      Cervical: No cervical adenopathy.   Skin:     General: Skin is warm and dry.   Neurological:      Mental Status: He is alert and oriented to person, place, and time.   Psychiatric:         Behavior: Behavior normal.         Lab Results   Component Value Date    WBC 8.10 03/11/2025    HGB 14.7 03/11/2025    HGB 15.3 03/15/2024    HGB 14.5 02/21/2022    HCT 48.2 03/11/2025    MCV 73 (L) 03/11/2025    MCV 74 (L) 03/15/2024    MCV 73 (L) 02/21/2022     03/11/2025    CHOL 154 03/11/2025    TRIG 79 03/11/2025    HDL 42 03/11/2025    LDLCALC 96.2 03/11/2025    LDLCALC 72.6 03/15/2024    LDLCALC 72.0 02/21/2022    ALT 31 03/11/2025    AST 21 03/11/2025     03/11/2025    K 4.2 03/11/2025    CALCIUM 9.4 03/11/2025     03/11/2025    CO2 24 03/11/2025    BUN 10 03/11/2025    CREATININE 0.8 03/11/2025    CREATININE 0.8 03/15/2024    CREATININE 0.9 02/21/2022    EGFRNORACEVR >60.0 03/11/2025    EGFRNORACEVR >60.0 03/15/2024    TSH 2.432 03/11/2025    TSH 2.217 03/15/2024    TSH 1.299 02/21/2022    PSA 0.86 03/11/2025    PSA 0.67 03/15/2024    PSA 0.56 01/26/2021    GLU 88 03/11/2025    HGBA1C 5.9 (H) 03/11/2025    HGBA1C 5.8 (H) 03/15/2024    [id number] 18 (L) 09/14/2018          The 10-year ASCVD risk score (Chaim DYE, et al., 2019) is: 1.1%    Values used to calculate the score:      Age: 39 years      Sex: Male      Is Non- : No      Diabetic: No      Tobacco smoker: No      Systolic Blood Pressure: 136 mmHg      Is BP treated: No      HDL Cholesterol: 42 mg/dL      Total Cholesterol: 154 mg/dL     Assessment:     1. Routine  general medical examination at a health care facility    2. Prediabetes    3. Iron deficiency anemia due to chronic blood loss    4. Vitamin D deficiency    5. Overweight (BMI 25.0-29.9)      Plan:   1. Routine general medical examination at a health care facility  Overview:  Heart healthy diet and regular exercise.  Health maintenance reviewed patient    Orders:  -     CBC Auto Differential; Future; Expected date: 03/18/2026  -     Comprehensive Metabolic Panel; Future; Expected date: 03/18/2026  -     Hemoglobin A1C; Future; Expected date: 03/18/2026  -     Lipid Panel; Future; Expected date: 03/18/2026  -     TSH; Future; Expected date: 03/18/2026  -     PSA, Screening; Future; Expected date: 03/18/2026    2. Prediabetes  Overview:  Diabetic diet and exercise.     Orders:  -     Ambulatory referral/consult to [facility]; Future; Expected date: 03/25/2025    3. Iron deficiency anemia due to chronic blood loss  Overview:  GI [facility]. DR. HANNON      4. Vitamin D deficiency    5. Overweight (BMI 25.0-29.9)  -     Ambulatory referral/consult to [facility]; Future; Expected date: 03/25/2025        There are no Patient Instructions on file for this visit.    Future Appointments   Date Time Provider Department Center   7/1/2025  2:00 PM Natividad Merlos FNP [facility]   3/12/2026  7:30 AM LABORATORY, [facility]   3/19/2026 10:00 AM Osvaldo Esposito MD [facility]       Lab Frequency Next Occurrence   Ambulatory referral/consult to [facility] Once 03/25/2024       Follow up in about 1 year (around 3/18/2026), or if symptoms worsen or fail to improve.                  [1]   Patient Active Problem List  Diagnosis    Routine general medical examination at a health care facility    Microcytic anemia    Vitamin D deficiency    Iron deficiency anemia due to chronic blood loss    Prediabetes   [2]   Social History  Socioeconomic History    Marital  status:    Tobacco Use    Smoking status: Never    Smokeless tobacco: Never   Substance and Sexual Activity    Alcohol use: No    Drug use: No    Sexual activity: Yes